# Patient Record
Sex: FEMALE | Race: WHITE | URBAN - METROPOLITAN AREA
[De-identification: names, ages, dates, MRNs, and addresses within clinical notes are randomized per-mention and may not be internally consistent; named-entity substitution may affect disease eponyms.]

---

## 2017-07-26 ENCOUNTER — RADIANT APPOINTMENT (OUTPATIENT)
Dept: MAMMOGRAPHY | Facility: CLINIC | Age: 57
End: 2017-07-26
Attending: INTERNAL MEDICINE
Payer: COMMERCIAL

## 2017-07-26 DIAGNOSIS — Z12.31 VISIT FOR SCREENING MAMMOGRAM: ICD-10-CM

## 2017-07-26 DIAGNOSIS — Z53.9 ERRONEOUS ENCOUNTER--DISREGARD: Primary | ICD-10-CM

## 2017-07-26 PROCEDURE — G0202 SCR MAMMO BI INCL CAD: HCPCS | Mod: TC

## 2017-07-26 PROCEDURE — 77063 BREAST TOMOSYNTHESIS BI: CPT | Mod: TC

## 2017-07-27 ENCOUNTER — OFFICE VISIT (OUTPATIENT)
Dept: INTERNAL MEDICINE | Facility: CLINIC | Age: 57
End: 2017-07-27
Payer: COMMERCIAL

## 2017-07-27 VITALS
HEIGHT: 67 IN | WEIGHT: 172.3 LBS | SYSTOLIC BLOOD PRESSURE: 120 MMHG | OXYGEN SATURATION: 98 % | HEART RATE: 72 BPM | BODY MASS INDEX: 27.04 KG/M2 | DIASTOLIC BLOOD PRESSURE: 80 MMHG | TEMPERATURE: 98.3 F

## 2017-07-27 DIAGNOSIS — D56.3 BETA THALASSEMIA TRAIT: ICD-10-CM

## 2017-07-27 DIAGNOSIS — Z12.4 SCREENING FOR CERVICAL CANCER: ICD-10-CM

## 2017-07-27 DIAGNOSIS — Z00.00 ENCOUNTER FOR ROUTINE ADULT HEALTH EXAMINATION WITHOUT ABNORMAL FINDINGS: Primary | ICD-10-CM

## 2017-07-27 PROCEDURE — G0145 SCR C/V CYTO,THINLAYER,RESCR: HCPCS | Performed by: INTERNAL MEDICINE

## 2017-07-27 PROCEDURE — 99396 PREV VISIT EST AGE 40-64: CPT | Performed by: INTERNAL MEDICINE

## 2017-07-27 PROCEDURE — 87624 HPV HI-RISK TYP POOLED RSLT: CPT | Performed by: INTERNAL MEDICINE

## 2017-07-27 NOTE — NURSING NOTE
"Chief Complaint   Patient presents with     Physical     Pt is fasting       Initial /80  Pulse 72  Temp 98.3  F (36.8  C) (Oral)  Ht 5' 7\" (1.702 m)  Wt 172 lb 4.8 oz (78.2 kg)  LMP 07/19/2015  SpO2 98%  BMI 26.99 kg/m2 Estimated body mass index is 26.99 kg/(m^2) as calculated from the following:    Height as of this encounter: 5' 7\" (1.702 m).    Weight as of this encounter: 172 lb 4.8 oz (78.2 kg).  Medication Reconciliation: complete   Shelia Braswell, ALFRED      "

## 2017-07-27 NOTE — PROGRESS NOTES
SUBJECTIVE:   CC: Genia Esquivel is an 56 year old woman who presents for preventive health visit.     Healthy Habits:    Do you get at least three servings of calcium containing foods daily (dairy, green leafy vegetables, etc.)? yes    Amount of exercise or daily activities, outside of work: 3 hrs a wk    Problems taking medications regularly Yes forgetting    Medication side effects: no    Have you had an eye exam in the past two years? yes    Do you see a dentist twice per year? yes    Do you have sleep apnea, excessive snoring or daytime drowsiness?no    Labwork through [employer] for work              Today's PHQ-2 Score: PHQ-2 ( 1999 [employer]) 7/27/2017 7/19/2016   Q1: Little interest or pleasure in doing things 0 0   Q2: Feeling down, depressed or hopeless 0 0   PHQ-2 Score 0 0   Q1: Little interest or pleasure in doing things - Not at all   Q2: Feeling down, depressed or hopeless - Not at all   PHQ-2 Score - 0       Abuse: Current or Past(Physical, Sexual or Emotional)- No  Do you feel safe in your environment - Yes    Social History   Substance Use Topics     Smoking status: Never Smoker     Smokeless tobacco: Never Used     Alcohol use Yes      Comment: x4 per week     The patient does not drink >3 drinks per day nor >7 drinks per week.    Reviewed orders with patient.  Reviewed health maintenance and updated orders accordingly - Yes      Patient over age 50, mutual decision to screen reflected in health maintenance.      Pertinent mammograms are reviewed under the imaging tab.  History of abnormal Pap smear: NO - age 30- 65 PAP every 3 years recommended    Reviewed and updated as needed this visit by clinical staffTobacco  Allergies         Reviewed and updated as needed this visit by Provider          Past Medical History:  ---------------------------  Past Medical History:   Diagnosis Date     Anemia     beta thalsiumia     Trigeminal neuralgia        Past Surgical  History:  ---------------------------  Past Surgical History:   Procedure Laterality Date     APPENDECTOMY       COLONOSCOPY       OSTEOTOMY FOOT Bilateral 4/7/2015    Procedure: OSTEOTOMY FOOT;  Surgeon: Elie Markham DPM;  [facility]     wisdom teeth         Current Medications:  ---------------------------  Current Outpatient Prescriptions   Medication Sig Dispense Refill     Cyanocobalamin (B-12 SUPER STRENGTH) 5000 MCG/ML LIQD Place 1 mL under the tongue daily FOR VITAMIN B12 SUPPLEMENTATION, PLEASE PLACE UNDER THE TONGUE 2 Bottle PRN     Calcium Carb-Cholecalciferol (CALCIUM 1000 + D) 1000-800 MG-UNIT TABS Take 1 tablet by mouth daily TAKE WITH FOOD, FOR BONE HEALTH AND FOR VITAMIN D SUPPLEMENTATION 100 tablet PRN     B Complex Vitamins (B COMPLEX 50) TABS Take 1 tablet by mouth twice a week INDICATION: VITAMIN SUPPLEMENT 100 tablet PRN     vitamin D3 (CHOLECALCIFEROL) 58484 UNITS capsule Take 1 capsule (50,000 Units) by mouth every 30 days INDICATION: VITAMIN D DEFICIENCY (LOW VITAMIN D) 40 capsule PRN       Allergies:  -------------  Allergies   Allergen Reactions     Niaspan [Niacin]      PARALYSIS AND SEVERE PAIN       Social History:  -------------------  Social History     Social History     Marital status:      Spouse name: N/A     Number of children: N/A     Years of education: N/A     Occupational History     tech sales [employer]     Social History Main Topics     Smoking status: Never Smoker     Smokeless tobacco: Never Used     Alcohol use Yes      Comment: x4 per week     Drug use: No     Sexual activity: Not Currently     Partners: Male      Comment: SPOUSE HAS HAD VASECTOMY     Other Topics Concern      Service No     Blood Transfusions No     Caffeine Concern No     Occupational Exposure No     Hobby Hazards No     Sleep Concern No     Stress Concern No     Weight Concern Yes     Special Diet No     Back Care No     Exercise No     Bike Helmet No     Seat Belt Yes      "Self-Exams Yes     Parent/Sibling W/ Cabg, Mi Or Angioplasty Before 65f 55m? No     Social History Narrative       Family Medical History:  ------------------------------  Family History   Problem Relation Age of Onset     DIABETES Mother      CANCER Mother      OVARIAN/at 72 yrs     Blood Disease Mother      Thalassemia     Alzheimer Disease Father      DIABETES Brother      at 41 yrs     DIABETES Sister      at 40 yrs     Lipids Brother      Obesity Brother      Connective Tissue Disorder Brother      Gout     DIABETES Brother      at 47yrs     Arthritis Maternal Grandmother      Rheumatoid     Blood Disease Son      Thalassemia     Blood Disease Brother      Thalassemia     Cancer - colorectal Maternal Aunt      At age 60 yrs     DIABETES Sister         ROS:  C: NEGATIVE for fever, chills, change in weight  I: NEGATIVE for worrisome rashes, moles or lesions  E: NEGATIVE for vision changes or irritation  ENT: NEGATIVE for ear, mouth and throat problems  R: NEGATIVE for significant cough or SOB  B: NEGATIVE for masses, tenderness or discharge  CV: NEGATIVE for chest pain, palpitations or peripheral edema  GI: NEGATIVE for nausea, abdominal pain, heartburn, or change in bowel habits  : NEGATIVE for unusual urinary or vaginal symptoms. No vaginal bleeding.  M: NEGATIVE for significant arthralgias or myalgia  N: NEGATIVE for weakness, dizziness or paresthesias  P: NEGATIVE for changes in mood or affect     OBJECTIVE:   /80  Pulse 72  Temp 98.3  F (36.8  C) (Oral)  Ht 5' 7\" (1.702 m)  Wt 172 lb 4.8 oz (78.2 kg)  LMP 07/19/2015  SpO2 98%  BMI 26.99 kg/m2  EXAM:    GENERAL healthy, alert and no distress.  EYES conjunctivae/corneas clear. PERRL, EOM's intact  HENT: NCAT,oral and posterior pharynx without lesions or erythema, facies symmetric  NECK: Neck supple. No LAD, without thyroidmegaly or JVD.  RESP: Clear to ausculation bilaterally without wheezes or crackles. Normal BS in all fields.  CV: RRR normal " S1S2 without  murmurs, rubs or gallops. PMI normal  LYMPH: no cervical lymph adenopathy appreciated  GI: NTND, no organomegaly, normal BS in all quadrants, without rebound or guarding  MS: No cyanosis, clubbing or edema noted bilaterally in Upper and Lower Extremities  SKIN: no ulcers, lesions or rash  NEURO: Alert and Oriented x 3, Gait normal. Reflexes normal and symmetric. Sensation grossly WNL..   BREAST:  Breasts are symmetric.  No dominant, discrete, fixed  or suspicious masses are noted.  Mild symmetric fibrocystic densities are noted in both upper outer quadrants. No skin or nipple changes or axillary nodes. Self exam is taught and encouraged. Mammogram status was reviewed with the pt and recommended if she is due.  Pelvic:  Vagina and vulva are normal;  no discharge is noted.  Cervix normal without lesions. Uterus anteverted and mobile, normal in size and shape without tenderness.  Adnexa normal in size without masses or tenderness. Pap Smear - is completed today.     Entire physical exam chaperoned by Nurse.     ASSESSMENT/PLAN:     (Z00.00) Encounter for routine adult health examination without abnormal findings  (primary encounter diagnosis)  Comment: Discussed self breast exam, schedule for mammogram.  Discussed importance of regular pelvic exams and PAP smears to check for GYN malignancies.  Discussed cardiac risk factor modification including screening for (and treating if present) cholesterol, HTN, DM, and avoiding smoking.  Recommended a low fat diet and regular aerobic activity.    Counseling:      ATP III Guidelines  ICSI Preventive Guidelines   Plan:     (Z12.4) Screening for cervical cancer  Comment:   Plan: Pap imaged thin layer screen with HPV -         recommended age 30 - 65 years (select HPV order        below), HPV High Risk Types DNA Cervical            (D56.3) Beta thalassemia trait  Comment: chronically low Hgb and MCV  Plan:        COUNSELING:   Reviewed preventive health counseling,  "as reflected in patient instructions       Regular exercise       Healthy diet/nutrition       Vision screening       Hearing screening       Aspirin Prophylaxsis       Alcohol Use       Colon cancer screening         reports that she has never smoked. She has never used smokeless tobacco.    Estimated body mass index is 26.99 kg/(m^2) as calculated from the following:    Height as of this encounter: 5' 7\" (1.702 m).    Weight as of this encounter: 172 lb 4.8 oz (78.2 kg).         Counseling Resources:  ATP IV Guidelines  Pooled Cohorts Equation Calculator  Breast Cancer Risk Calculator  FRAX Risk Assessment  ICSI Preventive Guidelines  Dietary Guidelines for Americans, 2010  USDA's MyPlate  ASA Prophylaxis  Lung CA Screening    Tenzin Waters MD  [facility]  "

## 2017-07-27 NOTE — MR AVS SNAPSHOT
"              After Visit Summary   7/27/2017    Genia Esquivel    MRN: [id number]           Patient Information     Date Of Birth          1960        Visit Information        Provider Department      7/27/2017 8:40 AM Tenzin Waters MD [facility]        Today's Diagnoses     Screening for cervical cancer    -  1      Care Instructions    *  Screening colonoscopy next due 2018    5 GOALS TO PREVENT VASCULAR DISEASE:     1.  Aggressive blood pressure control, under 130/80 ideally.  Using medications if needed.    Your blood pressure is under good control    BP Readings from Last 4 Encounters:   07/27/17 120/80   07/19/16 110/72   09/03/15 120/78   07/13/15 108/74       2.  Aggressive LDL cholesterol (\"bad cholesterol\") lowering as indicated.    Your goal is an LDL under 130 for sure, preferably under 100.  (If you have diabetes or previous vascular disease, the the LDL goals would be under 100 for sure, preferably under 70.)    New guidelines identify four high-risk groups who could benefit from statins:   *people with pre-existing heart disease, such as those who have had a heart attack;   *people ages 40 to 75 who have diabetes of any type  *patients ages 40 to 75 with at least a 7.5% risk of developing cardiovascular disease over the next decade, according to a formula described in the guidelines  *patients with the sort of super-high cholesterol that sometimes runs in families, as evidenced by an LDL of 190 milligrams per deciliter or higher    Your cholesterol levels are controlled.    Recent Labs   Lab Test  07/19/16   1029  08/29/14   0855  04/25/13   0808   CHOL  213*  167  172   HDL  73  64  57   LDL  101*  78  73   TRIG  193*  124  212*   CHOLHDLRATIO   --   2.6  3.0       3.  Aggressive diabetic prevention, screening and/or management.      You do not have diabetes as of the most recent blood tests.     4.  No smoking    5.  Consider taking low dose aspirin (81 " "mg) tablet once per day over the age of 50, every day unless there is a specific reason that you cannot take aspirin (such as side effect, allergy, or you are on another \"blood thinner\").        --Based on your relative lack of current cardiac risk factors, you do NOT need to take Aspirin on regular basis.       Preventive Health Recommendations  Female Ages 50 - 64    Yearly exam: See your health care provider every year in order to  o Review health changes.   o Discuss preventive care.    o Review your medicines if your doctor has prescribed any.      Get a Pap test every three years (unless you have an abnormal result and your provider advises testing more often).    If you get Pap tests with HPV test, you only need to test every 5 years, unless you have an abnormal result.     You do not need a Pap test if your uterus was removed (hysterectomy) and you have not had cancer.    You should be tested each year for STDs (sexually transmitted diseases) if you're at risk.     Have a mammogram every 1 to 2 years.    Have a colonoscopy at age 50, or have a yearly FIT test (stool test). These exams screen for colon cancer.      Have a cholesterol test every 5 years, or more often if advised.    Have a diabetes test (fasting glucose) every three years. If you are at risk for diabetes, you should have this test more often.     If you are at risk for osteoporosis (brittle bone disease), think about having a bone density scan (DEXA).    Shots: Get a flu shot each year. Get a tetanus shot every 10 years.    Nutrition:     Eat at least 5 servings of fruits and vegetables each day.    Eat whole-grain bread, whole-wheat pasta and brown rice instead of white grains and rice.    Talk to your provider about Calcium and Vitamin D.        --Good Grains:  Oats, brown rice, Quinoa (these do not raise the blood sugar as much)     --Bad grains:  Anything made from wheat or white rice     (because these raise the blood sugars " "significantly, and the possible gluten issue from wheat for some people).      --Proteins:  Aim for \"lean proteins\" including chicken, fish, seafood, pork, turkey, and eggs (in moderation); Eat red meat only occasionally          Elie Edwigeanatoly:                    Lifestyle    Exercise at least 150 minutes a week (30 minutes a day, 5 days a week). This will help you control your weight and prevent disease.    Limit alcohol to one drink per day.    No smoking.     Wear sunscreen to prevent skin cancer.     See your dentist every six months for an exam and cleaning.    See your eye doctor every 1 to 2 years.            Follow-ups after your visit        Who to contact     If you have questions or need follow up information about today's clinic visit or your schedule please contact [facility] directly at [phone].  Normal or non-critical lab and imaging results will be communicated to you by [employer], letter or phone within 4 business days after the clinic has received the results. If you do not hear from us within 7 days, please contact the clinic through [employer] or phone. If you have a critical or abnormal lab result, we will notify you by phone as soon as possible.  Submit refill requests through [employer] or call your pharmacy and they will forward the refill request to us. Please allow 3 business days for your refill to be completed.          Additional Information About Your Visit        [employer] Information     [employer] gives you secure access to your electronic health record. If you see a primary care provider, you can also send messages to your care team and make appointments. If you have questions, please call your primary care clinic.  If you do not have a primary care provider, please call [phone] and they will assist you.        Care EveryWhere ID     This is your Care EveryWhere ID. This could be used by other organizations to access your [facility] " "records  [id number]        Your Vitals Were     Pulse Temperature Height Last Period Pulse Oximetry BMI (Body Mass Index)    72 98.3  F (36.8  C) (Oral) 5' 7\" (1.702 m) 07/19/2015 98% 26.99 kg/m2       Blood Pressure from Last 3 Encounters:   07/27/17 120/80   07/19/16 110/72   09/03/15 120/78    Weight from Last 3 Encounters:   07/27/17 172 lb 4.8 oz (78.2 kg)   07/19/16 175 lb 12.8 oz (79.7 kg)   09/03/15 169 lb (76.7 kg)              We Performed the Following     HPV High Risk Types DNA Cervical     Pap imaged thin layer screen with HPV - recommended age 30 - 65 years (select HPV order below)        Primary Care Provider Office Phone # Fax #    John Giles [phone]       [facility] [address] [facility] [address]        Equal Access to Services     KANE KING AH: Hadii barbara das hadasho Soomaali, waaxda luqadaha, qaybta kaalmada adeegyada, bennie gaona . So [facility] [phone].    ATENCIÓN: Si habla español, tiene a montes disposición servicios gratuitos de asistencia lingüística. Llame al [phone].    We comply with applicable federal civil rights laws and [place] laws. We do not discriminate on the basis of race, color, national origin, age, disability sex, sexual orientation or gender identity.            Thank you!     Thank you for choosing [facility]  for your care. Our goal is always to provide you with excellent care. Hearing back from our patients is one way we can continue to improve our services. Please take a few minutes to complete the written survey that you may receive in the mail after your visit with us. Thank you!             Your Updated Medication List - Protect others around you: Learn how to safely use, store and throw away your medicines at www.disposemymeds.org.          This list is accurate as of: 7/27/17 10:27 AM.  Always use your most recent med list.                   Brand Name Dispense " Instructions for use Diagnosis    B COMPLEX 50 Tabs     100 tablet    Take 1 tablet by mouth twice a week INDICATION: VITAMIN SUPPLEMENT    Thiamin deficiency       Calcium Carb-Cholecalciferol 1000-800 MG-UNIT Tabs    CALCIUM 1000 + D    100 tablet    Take 1 tablet by mouth daily TAKE WITH FOOD, FOR BONE HEALTH AND FOR VITAMIN D SUPPLEMENTATION    Metabolic syndrome       Cyanocobalamin 5000 MCG/ML Liqd    B-12 SUPER STRENGTH    2 Bottle    Place 1 mL under the tongue daily FOR VITAMIN B12 SUPPLEMENTATION, PLEASE PLACE UNDER THE TONGUE    Vitamin B12 deficiency (non anemic)       vitamin D3 08184 UNITS capsule    CHOLECALCIFEROL    40 capsule    Take 1 capsule (50,000 Units) by mouth every 30 days INDICATION: VITAMIN D DEFICIENCY (LOW VITAMIN D)    Vitamin D deficiency disease

## 2017-07-27 NOTE — PATIENT INSTRUCTIONS
"*  Screening colonoscopy next due 2018    5 GOALS TO PREVENT VASCULAR DISEASE:     1.  Aggressive blood pressure control, under 130/80 ideally.  Using medications if needed.    Your blood pressure is under good control    BP Readings from Last 4 Encounters:   07/27/17 120/80   07/19/16 110/72   09/03/15 120/78   07/13/15 108/74       2.  Aggressive LDL cholesterol (\"bad cholesterol\") lowering as indicated.    Your goal is an LDL under 130 for sure, preferably under 100.  (If you have diabetes or previous vascular disease, the the LDL goals would be under 100 for sure, preferably under 70.)    New guidelines identify four high-risk groups who could benefit from statins:   *people with pre-existing heart disease, such as those who have had a heart attack;   *people ages 40 to 75 who have diabetes of any type  *patients ages 40 to 75 with at least a 7.5% risk of developing cardiovascular disease over the next decade, according to a formula described in the guidelines  *patients with the sort of super-high cholesterol that sometimes runs in families, as evidenced by an LDL of 190 milligrams per deciliter or higher    Your cholesterol levels are controlled.    Recent Labs   Lab Test  07/19/16   1029  08/29/14   0855  04/25/13   0808   CHOL  213*  167  172   HDL  73  64  57   LDL  101*  78  73   TRIG  193*  124  212*   CHOLHDLRATIO   --   2.6  3.0       3.  Aggressive diabetic prevention, screening and/or management.      You do not have diabetes as of the most recent blood tests.     4.  No smoking    5.  Consider taking low dose aspirin (81 mg) tablet once per day over the age of 50, every day unless there is a specific reason that you cannot take aspirin (such as side effect, allergy, or you are on another \"blood thinner\").        --Based on your relative lack of current cardiac risk factors, you do NOT need to take Aspirin on regular basis.       Preventive Health Recommendations  Female Ages 50 - 64    Yearly exam: " "See your health care provider every year in order to  o Review health changes.   o Discuss preventive care.    o Review your medicines if your doctor has prescribed any.      Get a Pap test every three years (unless you have an abnormal result and your provider advises testing more often).    If you get Pap tests with HPV test, you only need to test every 5 years, unless you have an abnormal result.     You do not need a Pap test if your uterus was removed (hysterectomy) and you have not had cancer.    You should be tested each year for STDs (sexually transmitted diseases) if you're at risk.     Have a mammogram every 1 to 2 years.    Have a colonoscopy at age 50, or have a yearly FIT test (stool test). These exams screen for colon cancer.      Have a cholesterol test every 5 years, or more often if advised.    Have a diabetes test (fasting glucose) every three years. If you are at risk for diabetes, you should have this test more often.     If you are at risk for osteoporosis (brittle bone disease), think about having a bone density scan (DEXA).    Shots: Get a flu shot each year. Get a tetanus shot every 10 years.    Nutrition:     Eat at least 5 servings of fruits and vegetables each day.    Eat whole-grain bread, whole-wheat pasta and brown rice instead of white grains and rice.    Talk to your provider about Calcium and Vitamin D.        --Good Grains:  Oats, brown rice, Quinoa (these do not raise the blood sugar as much)     --Bad grains:  Anything made from wheat or white rice     (because these raise the blood sugars significantly, and the possible gluten issue from wheat for some people).      --Proteins:  Aim for \"lean proteins\" including chicken, fish, seafood, pork, turkey, and eggs (in moderation); Eat red meat only occasionally          Elie Matamoros:                    Lifestyle    Exercise at least 150 minutes a week (30 minutes a day, 5 days a week). This will help you control your weight and " prevent disease.    Limit alcohol to one drink per day.    No smoking.     Wear sunscreen to prevent skin cancer.     See your dentist every six months for an exam and cleaning.    See your eye doctor every 1 to 2 years.

## 2017-08-01 LAB
COPATH REPORT: NORMAL
PAP: NORMAL

## 2017-08-03 LAB
FINAL DIAGNOSIS: NORMAL
HPV HR 12 DNA CVX QL NAA+PROBE: NEGATIVE
HPV16 DNA SPEC QL NAA+PROBE: NEGATIVE
HPV18 DNA SPEC QL NAA+PROBE: NEGATIVE
SPECIMEN DESCRIPTION: NORMAL

## 2017-09-08 ENCOUNTER — TRANSFERRED RECORDS (OUTPATIENT)
Dept: HEALTH INFORMATION MANAGEMENT | Facility: CLINIC | Age: 57
End: 2017-09-08

## 2017-09-08 LAB
ALT SERPL-CCNC: 61 U/L (ref 9–52)
AST SERPL-CCNC: 130 U/L (ref 14–36)
CREAT SERPL-MCNC: 0.7 MG/DL (ref 0.5–1)
GFR SERPL CREATININE-BSD FRML MDRD: >60 ML/MIN/1.73M2
GLUCOSE SERPL-MCNC: 117 MG/DL (ref 70–125)
POTASSIUM SERPL-SCNC: 3.6 MMOL/L (ref 3.5–5.1)

## 2017-09-11 ENCOUNTER — MYC MEDICAL ADVICE (OUTPATIENT)
Dept: INTERNAL MEDICINE | Facility: CLINIC | Age: 57
End: 2017-09-11

## 2017-09-11 DIAGNOSIS — K80.20 GALLSTONES: Primary | ICD-10-CM

## 2017-09-11 DIAGNOSIS — K80.80 BILIARY CALCULUS OF OTHER SITE WITHOUT OBSTRUCTION: ICD-10-CM

## 2017-09-13 NOTE — TELEPHONE ENCOUNTER
No need to come for an appointment just to get referral.   See the surgeons.   Referral ordered.   Return to see us if a pre-op if needed.

## 2017-09-13 NOTE — TELEPHONE ENCOUNTER
Patient needing a referral for general surgery d/t gallstones.  Order pended, please sign if appropriate.

## 2017-09-13 NOTE — TELEPHONE ENCOUNTER
Agree with plan. Also called and left a message on Genia's voicemail for her to call if she has any other questions or concerns

## 2017-09-13 NOTE — TELEPHONE ENCOUNTER
Pt is requesting a referral to general surgeon . Information from [place] on MD desk .Neda Rivera RN

## 2017-09-21 ENCOUNTER — OFFICE VISIT (OUTPATIENT)
Dept: SURGERY | Facility: CLINIC | Age: 57
End: 2017-09-21
Payer: COMMERCIAL

## 2017-09-21 VITALS
HEIGHT: 67 IN | SYSTOLIC BLOOD PRESSURE: 90 MMHG | BODY MASS INDEX: 27 KG/M2 | HEART RATE: 70 BPM | WEIGHT: 172 LBS | DIASTOLIC BLOOD PRESSURE: 58 MMHG

## 2017-09-21 DIAGNOSIS — K80.20 SYMPTOMATIC CHOLELITHIASIS: Primary | ICD-10-CM

## 2017-09-21 PROCEDURE — 99204 OFFICE O/P NEW MOD 45 MIN: CPT | Performed by: SURGERY

## 2017-09-21 NOTE — LETTER
[facility]    September 21, 2017        Elei Atwood MD     Genia Infante MRN# [id number]   YOB: 1960 Age: 57 year old      PCP:  John Giles [phone]     Chief Complaint:  Right upper quadrant pain, nausea     History of Present Illness:  Genia Infante is a 57 year old female who presented with several episodes of upper abdominal pain and intermittent nausea, usually after eating.  Commonly associated with eating greasy foods.  She had her most severe episode recently when she was traveling to [place].  She was seen in an emergency department where she was found to have mildly elevated transaminases.  Right upper quadrant ultrasound was performed and this revealed gallstones.  No obvious evidence for cholecystitis and she was discharged.  The patient is now here to discuss diagnosis and management options.     PMH:  Genia Infante  has a past medical history of Anemia and Trigeminal neuralgia.  PSH:  Genia Infante  has a past surgical history that includes wisdom teeth; appendectomy; colonoscopy; and Osteotomy foot (Bilateral, 4/7/2015).     Home medications and allergies reviewed.     Social History:  Genia Infante  reports that she has never smoked. She has never used smokeless tobacco. She reports that she drinks alcohol. She reports that she does not use illicit drugs.  Family History:  Genia Infante family history includes Alzheimer Disease in her father; Arthritis in her maternal grandmother; Blood Disease in her brother, mother, and son; CANCER in her mother; Cancer - colorectal in her maternal aunt; Connective Tissue Disorder in her brother; DIABETES in her brother, brother, mother, sister, and sister; Lipids in her brother; Obesity in her brother.     ROS:  The 10 point Review of Systems is negative other than noted in the HPI.  Currently no nausea or vomiting.  No significant pain or dysuria..     Physical  "Exam:  Blood pressure 90/58, pulse 70, height 5' 7\" (1.702 m), weight 172 lb (78 kg), last menstrual period 07/19/2015, not currently breastfeeding.  172 lbs 0 oz  Healthy-appearing female in no distress.  Patient has a pleasant affect, speaks without difficulty.   Pupils equal round and reactive to light.   No cervical lymphadenopathy or thyromegaly.   Lung fields clear, breathing comfortably.   Heart normal sinus rhythm.  No murmurs rubs or gallops.  Abdomen soft, nontender, nondistended.  Minimal tenderness in the right upper quadrant, no masses appreciated. No peritoneal signs or rebound.  Skin warm, dry, without rashes or lesions.     All new lab and imaging data was reviewed.  Abdominal ultrasound shows gallbladder with stones, no wall thickening.        Assessment/plan:  Genia Infante is a 57 year old female with signs and symptoms suggesting symptomatic cholelithiasis. I've recommended laparoscopic cholecystectomy. Surgical comorbidities include previous abdominal surgery, appendectomy as a child.  I feel the patient is a good candidate for the surgery and that this should be able to be done as an outpatient. They were going to consider their options and likely schedule surgery.     Fidel Atwood M.D.  [facility]  [phone]    "

## 2017-09-21 NOTE — MR AVS SNAPSHOT
"              After Visit Summary   9/21/2017    Genia Infante    MRN: [id number]           Patient Information     Date Of Birth          1960        Visit Information        Provider Department      9/21/2017 2:15 PM Elie Atwood MD [facility] General Surgery       Follow-ups after your visit        Who to contact     If you have questions or need follow up information about today's clinic visit or your schedule please contact [facility] directly at [phone].  Normal or non-critical lab and imaging results will be communicated to you by [employer], letter or phone within 4 business days after the clinic has received the results. If you do not hear from us within 7 days, please contact the clinic through [employer] or phone. If you have a critical or abnormal lab result, we will notify you by phone as soon as possible.  Submit refill requests through [employer] or call your pharmacy and they will forward the refill request to us. Please allow 3 business days for your refill to be completed.          Additional Information About Your Visit        MyChart Information     [employer] gives you secure access to your electronic health record. If you see a primary care provider, you can also send messages to your care team and make appointments. If you have questions, please call your primary care clinic.  If you do not have a primary care provider, please call [phone] and they will assist you.        Care EveryWhere ID     This is your Care EveryWhere ID. This could be used by other organizations to access your [place] medical records  [id number]        Your Vitals Were     Pulse Height Last Period BMI (Body Mass Index)          70 5' 7\" (1.702 m) 07/19/2015 26.94 kg/m2         Blood Pressure from Last 3 Encounters:   09/21/17 90/58   07/27/17 120/80   07/19/16 110/72    Weight from Last 3 Encounters:   09/21/17 172 lb (78 kg)   07/27/17 " 172 lb 4.8 oz (78.2 kg)   07/19/16 175 lb 12.8 oz (79.7 kg)              Today, you had the following     No orders found for display       Primary Care Provider Office Phone # Fax #    John Giles [phone]       [address] [facility] [address]        Equal Access to Services     [facility]: Hadii aad ku hadasho Soomaali, waaxda luqadaha, qaybta kaalmada adeegyada, waxay idiin hayaan adeeg bam laPeterbindun ah. So [facility] [phone].    ATENCIÓN: Si toyala fran, tiene a montes disposición servicios gratuitos de asistencia lingüística. [facility] [phone].    We comply with applicable federal civil rights laws and [place] laws. We do not discriminate on the basis of race, color, national origin, age, disability sex, sexual orientation or gender identity.            Thank you!     Thank you for choosing [facility]  for your care. Our goal is always to provide you with excellent care. Hearing back from our patients is one way we can continue to improve our services. Please take a few minutes to complete the written survey that you may receive in the mail after your visit with us. Thank you!             Your Updated Medication List - Protect others around you: Learn how to safely use, store and throw away your medicines at www.disposemymeds.org.          This list is accurate as of: 9/21/17  2:44 PM.  Always use your most recent med list.                   Brand Name Dispense Instructions for use Diagnosis    B COMPLEX 50 Tabs     100 tablet    Take 1 tablet by mouth twice a week INDICATION: VITAMIN SUPPLEMENT    Thiamin deficiency       Calcium Carb-Cholecalciferol 1000-800 MG-UNIT Tabs    CALCIUM 1000 + D    100 tablet    Take 1 tablet by mouth daily TAKE WITH FOOD, FOR BONE HEALTH AND FOR VITAMIN D SUPPLEMENTATION    Metabolic syndrome       Cyanocobalamin 5000 MCG/ML Liqd    B-12 SUPER STRENGTH    2 Bottle    Place 1 mL under the tongue daily FOR VITAMIN B12  SUPPLEMENTATION, PLEASE PLACE UNDER THE TONGUE    Vitamin B12 deficiency (non anemic)       vitamin D3 00941 UNITS capsule    CHOLECALCIFEROL    40 capsule    Take 1 capsule (50,000 Units) by mouth every 30 days INDICATION: VITAMIN D DEFICIENCY (LOW VITAMIN D)    Vitamin D deficiency disease

## 2017-09-22 PROBLEM — K80.20 SYMPTOMATIC CHOLELITHIASIS: Status: ACTIVE | Noted: 2017-09-22

## 2017-09-22 NOTE — PROGRESS NOTES
Surgery Consultation, [facility] DOMINIQUE Atwood MD    Genia Infante MRN# [id number]   YOB: 1960 Age: 57 year old     PCP:  John Giles [phone]    Chief Complaint:  Right upper quadrant pain, nausea    History of Present Illness:  Genia Infante is a 57 year old female who presented with several episodes of upper abdominal pain and intermittent nausea, usually after eating.  Commonly associated with eating greasy foods.  She had her most severe episode recently when she was traveling to [place].  She was seen in an emergency department where she was found to have mildly elevated transaminases.  Right upper quadrant ultrasound was performed and this revealed gallstones.  No obvious evidence for cholecystitis and she was discharged.  The patient is now here to discuss diagnosis and management options.    PMH:  Genia Infante  has a past medical history of Anemia and Trigeminal neuralgia.  PSH:  Genia Infante  has a past surgical history that includes wisdom teeth; appendectomy; colonoscopy; and Osteotomy foot (Bilateral, 4/7/2015).    Home medications and allergies reviewed.    Social History:  Genia Infante  reports that she has never smoked. She has never used smokeless tobacco. She reports that she drinks alcohol. She reports that she does not use illicit drugs.  Family History:  Genia Infante family history includes Alzheimer Disease in her father; Arthritis in her maternal grandmother; Blood Disease in her brother, mother, and son; CANCER in her mother; Cancer - colorectal in her maternal aunt; Connective Tissue Disorder in her brother; DIABETES in her brother, brother, mother, sister, and sister; Lipids in her brother; Obesity in her brother.    ROS:  The 10 point Review of Systems is negative other than noted in the HPI.  Currently no nausea or vomiting.  No significant pain or dysuria..    Physical Exam:  Blood pressure 90/58, pulse  "70, height 5' 7\" (1.702 m), weight 172 lb (78 kg), last menstrual period 07/19/2015, not currently breastfeeding.  172 lbs 0 oz  Healthy-appearing female in no distress.  Patient has a pleasant affect, speaks without difficulty.   Pupils equal round and reactive to light.   No cervical lymphadenopathy or thyromegaly.   Lung fields clear, breathing comfortably.   Heart normal sinus rhythm.  No murmurs rubs or gallops.  Abdomen soft, nontender, nondistended.  Minimal tenderness in the right upper quadrant, no masses appreciated. No peritoneal signs or rebound.  Skin warm, dry, without rashes or lesions.    All new lab and imaging data was reviewed.  Abdominal ultrasound shows gallbladder with stones, no wall thickening.       Assessment/plan:  Genia Infante is a 57 year old female with signs and symptoms suggesting symptomatic cholelithiasis. I've recommended laparoscopic cholecystectomy. Surgical comorbidities include previous abdominal surgery, appendectomy as a child.  I feel the patient is a good candidate for the surgery and that this should be able to be done as an outpatient. They were going to consider their options and likely schedule surgery.    Fidel Atwood M.D.  [facility]  [phone]    Please route or send letter to:  Primary Care Provider (PCP) and Referring Provider  "

## 2017-10-02 ENCOUNTER — OFFICE VISIT (OUTPATIENT)
Dept: INTERNAL MEDICINE | Facility: CLINIC | Age: 57
End: 2017-10-02
Payer: COMMERCIAL

## 2017-10-02 VITALS
HEIGHT: 67 IN | OXYGEN SATURATION: 100 % | DIASTOLIC BLOOD PRESSURE: 82 MMHG | WEIGHT: 178.2 LBS | SYSTOLIC BLOOD PRESSURE: 118 MMHG | BODY MASS INDEX: 27.97 KG/M2 | HEART RATE: 69 BPM | TEMPERATURE: 97.7 F

## 2017-10-02 DIAGNOSIS — D50.9 IRON DEFICIENCY ANEMIA, UNSPECIFIED IRON DEFICIENCY ANEMIA TYPE: ICD-10-CM

## 2017-10-02 DIAGNOSIS — E88.810 METABOLIC SYNDROME: ICD-10-CM

## 2017-10-02 DIAGNOSIS — K80.20 CALCULUS OF GALLBLADDER WITHOUT CHOLECYSTITIS WITHOUT OBSTRUCTION: ICD-10-CM

## 2017-10-02 DIAGNOSIS — K80.20 SYMPTOMATIC CHOLELITHIASIS: ICD-10-CM

## 2017-10-02 DIAGNOSIS — Z01.818 PREOP GENERAL PHYSICAL EXAM: Primary | ICD-10-CM

## 2017-10-02 PROCEDURE — 99215 OFFICE O/P EST HI 40 MIN: CPT | Performed by: PHYSICIAN ASSISTANT

## 2017-10-02 NOTE — PROGRESS NOTES
[address] [address]  [phone]  Dept: [phone]    PRE-OP EVALUATION:  Today's date: 10/2/2017    Genia Infante (: 1960) presents for pre-operative evaluation assessment as requested by Dr. Atwood .  She requires evaluation and anesthesia risk assessment prior to undergoing surgery/procedure for treatment of Gallbladder stones .  Proposed procedure:LAPAROSCOPIC CHOLECYSTECTOMY     Date of Surgery/ Procedure: 10/09  Time of Surgery/ Procedure: 7:30  Hospital/Surgical [facility]     Primary Physician: John Giles  Type of Anesthesia Anticipated: General    Patient has a Health Care Directive or Living Will:  NO    Preop Questions 10/2/2017   1.  Do you have a history of heart attack, stroke, stent, bypass or surgery on an artery in the head, neck, heart or legs? No   2.  Do you ever have any pain or discomfort in your chest? No   3.  Do you have a history of  Heart Failure? No   4.   Are you troubled by shortness of breath when:  walking on a level surface, or up a slight hill, or at night? No   5.  Do you currently have a cold, bronchitis or other respiratory infection? No   6.  Do you have a cough, shortness of breath, or wheezing? No   7.  Do you sometimes get pains in the calves of your legs when you walk? No   8. Do you or anyone in your family have previous history of blood clots? No   9.  Do you or does anyone in your family have a serious bleeding problem such as prolonged bleeding following surgeries or cuts? No   10. Have you ever had problems with anemia or been told to take iron pills? YES - self    11. Have you had any abnormal blood loss such as black, tarry or bloody stools, or abnormal vaginal bleeding? No   12. Have you ever had a blood transfusion? No   13. Have you or any of your relatives ever had problems with anesthesia? No   14. Do you have sleep apnea, excessive snoring or daytime drowsiness? No    15. Do you have any prosthetic heart valves? No   16. Do you have prosthetic joints? No   17. Is there any chance that you may be pregnant? No           HPI:                                                      Brief HPI related to upcoming procedure: cholelithiasis.  ER visit 9/8/2017 in [place] for RUQ abdominal pain.  She has been on a low fat diet since dx.       See problem list for active medical problems.  Problems all longstanding and stable, except as noted/documented.  See ROS for pertinent symptoms related to these conditions.                                                                                                  .    MEDICAL HISTORY:                                                    Patient Active Problem List    Diagnosis Date Noted     Symptomatic cholelithiasis 09/22/2017     Priority: Medium     Cystocele, midline 07/14/2014     Priority: Medium     Urinary incontinence 07/14/2014     Priority: Medium     Vitamin D deficiency 05/02/2013     Priority: Medium     WITH BORDERLINE B12 LEVELS       Thiamin deficiency 05/02/2013     Priority: Medium     Iron deficiency anemia 05/02/2013     Priority: Medium     Metabolic syndrome 05/02/2013     Priority: Medium     Vitamin B12 deficiency without anemia 11/09/2012     Priority: Medium     Diagnosis updated by automated process. Provider to review and confirm.       Vitamin D deficiency disease 11/09/2012     Priority: Medium     Seasonal allergic rhinitis 11/09/2012     Priority: Medium     CARDIOVASCULAR SCREENING; LDL GOAL LESS THAN 160 10/31/2010     Priority: Medium     Beta thalassemia trait 09/12/2008     Priority: Medium     Diagnosis updated by automated process. Provider to review and confirm.       Trigeminal neuralgia      Priority: Medium      Past Medical History:   Diagnosis Date     Anemia     beta thalsiumia     Trigeminal neuralgia      Past Surgical History:   Procedure Laterality Date     APPENDECTOMY       COLONOSCOPY        OSTEOTOMY FOOT Bilateral 4/7/2015    Procedure: OSTEOTOMY FOOT;  Surgeon: Elie Markham DPM;  Location:  [facility]     wisdom teeth       Current Outpatient Prescriptions   Medication Sig Dispense Refill     Cyanocobalamin (B-12 SUPER STRENGTH) 5000 MCG/ML LIQD Place 1 mL under the tongue daily FOR VITAMIN B12 SUPPLEMENTATION, PLEASE PLACE UNDER THE TONGUE 2 Bottle PRN     Calcium Carb-Cholecalciferol (CALCIUM 1000 + D) 1000-800 MG-UNIT TABS Take 1 tablet by mouth daily TAKE WITH FOOD, FOR BONE HEALTH AND FOR VITAMIN D SUPPLEMENTATION 100 tablet PRN     B Complex Vitamins (B COMPLEX 50) TABS Take 1 tablet by mouth twice a week INDICATION: VITAMIN SUPPLEMENT 100 tablet PRN     vitamin D3 (CHOLECALCIFEROL) 62075 UNITS capsule Take 1 capsule (50,000 Units) by mouth every 30 days INDICATION: VITAMIN D DEFICIENCY (LOW VITAMIN D) 40 capsule PRN     OTC products: None, except as noted above and no recent use of OTC ASA, NSAIDS or Steroids    Allergies   Allergen Reactions     Niaspan [Niacin]      PARALYSIS AND SEVERE PAIN      Latex Allergy: NO    Social History   Substance Use Topics     Smoking status: Never Smoker     Smokeless tobacco: Never Used     Alcohol use Yes      Comment: x4 per week     History   Drug Use No       REVIEW OF SYSTEMS:                                                    C: NEGATIVE for fever, chills, change in weight  INTEGUMENTARY/SKIN: NEGATIVE for worrisome rashes, moles or lesions  EYES: NEGATIVE for vision changes or irritation  E/M: NEGATIVE for ear, mouth and throat problems  R: NEGATIVE for significant cough or SOB  CV: NEGATIVE for chest pain, palpitations or peripheral edema  MUSCULOSKELETAL: NEGATIVE for significant arthralgias or myalgia  NEURO: NEGATIVE for weakness, dizziness or paresthesias  ENDOCRINE: NEGATIVE for temperature intolerance, skin/hair changes  HEME/ALLERGY/IMMUNE: NEGATIVE for bleeding problems  PSYCHIATRIC: NEGATIVE for changes in mood or affect  ROS otherwise  "negative    EXAM:                                                    /82 (BP Location: Left arm, Patient Position: Chair, Cuff Size: Adult Regular)  Pulse 69  Temp 97.7  F (36.5  C) (Oral)  Ht 5' 7\" (1.702 m)  Wt 178 lb 3.2 oz (80.8 kg)  LMP 07/19/2015  SpO2 100%  BMI 27.91 kg/m2  GENERAL APPEARANCE: healthy, alert and no distress  EYES: Eyes grossly normal to inspection, PERRL and conjunctivae and sclerae normal  HENT: ear canals and TM's normal and nose and mouth without ulcers or lesions  RESP: lungs clear to auscultation - no rales, rhonchi or wheezes  CV: regular rate and rhythm, normal S1 S2, no S3 or S4 and no murmur, click or rub   ABDOMEN: soft, nontender, no HSM or masses and bowel sounds normal  MS: extremities normal- no gross deformities noted  SKIN: no suspicious lesions or rashes  NEURO: Normal strength and tone, sensory exam grossly normal, mentation intact and speech normal  PSYCH: mentation appears normal and affect normal/bright    DIAGNOSTICS:                                                    EKG: appears normal, NSR, normal axis, normal intervals, no acute ST/T changes c/w ischemia, no LVH by voltage criteria, there are no prior tracings available  From [facility]  12-Lead ECG (09/08/2017 3:13 PM)  12-Lead ECG (09/08/2017 3:13 PM)   Component Value Ref Range   VENTRICULAR RATE EKG/MIN 61 BPM   ATRIAL RATE 61 BPM   AR-INTERVAL (MSEC) 172 ms   QRS-INTERVAL (MSEC) 86 ms   QT-INTERVAL (MSEC) 400 ms    ms   P AXIS 39 degrees   R AXIS 82 degrees   T AXIS 64 degrees     12-Lead ECG (09/08/2017 3:13 PM)   Specimen Performing Laboratory     MUSE       12-Lead ECG (09/08/2017 3:13 PM)   Impressions   Normal sinus rhythm    Normal ECG    No previous ECGs available       12-Lead ECG (09/08/2017 3:13 PM)   Procedure Note   Interface, Cardiology Orders In - 09/09/2017 8:09 AM PDT    IMPRESSION:  Normal sinus rhythm  Normal ECG  No previous ECGs available         Comprehensive " metabolic panel (09/08/2017 1:30 PM)  Comprehensive metabolic panel (09/08/2017 1:30 PM)   Component Value Ref Range   Sodium 137 137 - 145 mmol/L   Potassium 3.6 3.5 - 5.1 mmol/L   Chloride 104 98 - 107 mmol/L   CO2 Content 24 22 - 30 mmol/L   Glucose 117  Comment:   For FASTING GLUCOSE samples, the ADA recommended decision limits are shown below:     70-99 mg/dL:       Normal     100-125 mg/dL:     Impaired Fasting Glucose     >125 mg/dL:        Diabetes*  *In the absence of symptoms of unequivocal hyperglycemia, result should be confirmed by repeat testing.     70 - 125 mg/dL   Urea Nitrogen,Blood 16 7 - 17 mg/dL   Creatinine, Serum 0.7 0.5 - 1.0 mg/dL   Osmo Calc 286 280 - 305 mOs/Kg H2O   Albumin 4.3 3.5 - 5.0 g/dL   Protein, Total 7.4 6.3 - 8.2 g/dL   Calcium 9.4 8.4 - 10.2 mg/dL   Alk Phos 75 38 - 126 Units/L    (H) 14 - 36 Units/L   Bilirubin, Total 0.8 0.2 - 1.3 mg/dL   Anion Gap 9 6 - 14 mmol/L   ALT 61 (H) 9 - 52 Units/L   GFR Calc,Non-African >60 >60 mL/mn/1.73m2   GFRA Calc,African >60Comment: This estimated GFR calculation is not suitable for medication dosing. >60 mL/mn/1.73m2       CBC with Differential (09/08/2017 1:30 PM)  CBC with Differential (09/08/2017 1:30 PM)   Component Value Ref Range   WBC 6.1 3.8 - 11.0 K/mcL   RBC 4.98 4.0 - 5.2 M/mcL   Hgb 11.5 (L) 12.0 - 16.0 g/dL   HCT 34.6 (L) 36 - 46 %   MCV 70 (L) 80 - 100 fL   MCH 23 (L) 26 - 34 pg   MCHC 33 31 - 36 g/dL   RDW 14.0 (H) 10.5 - 13.5 %    150 - 450 K/mcL   Prelim Abs Neut Ct 4.18 1.8 - 7.7 K/mcL   Diff Type Auto     Neutrophils 69.1 44 - 70 %   Lymphocytes 25.6 25 - 46 %   Monocytes 4.0 1 - 12 %   Eosinophils 1.2 0 - 8 %   Basophils 0.1 0 - 2 %   Abs. Neut Ct. 4.18 1.8 - 7.7 K/mcL   Abs. Lymph Ct. 1.55 1.0 - 5.0 K/mcL   Abs. Mono Ct. 0.24 0 - 0.8 K/mcL   Abs. Eos Ct. 0.07 0 - 0.5 K/mcL   Abs. Baso Ct. 0.01 0 - 0.2 K/mcL     Lipase (09/08/2017 1:30 PM)  Lipase (09/08/2017 1:30 PM)   Component Value Ref Range   Lipase 112  23 - 300 Units/L     Interface, Radiology Results In - 09/08/2017 5:44 PM PDT    [facility]  [facility]  [address] [place], [place] [id number]   Telephone: [phone]  Fax: [phone]    Patient Name: PHILLY TREVINO Medical   Record #: [id number]    Accession #: [id number] Patient Type: ER  Acct #: [id number] Exam Date: 9/08/2017 Patient Location:   YOB: 1960 Patient Age: 56 Sex: F Exam   Time: 16:43  Referring Doctor: MILEY HARMON MD     Contrast or Isotope, if given (Agent/Dose-ml/Tech):   Rad Dose: CTDIvol (mGy)/DLP (mGy-cm), or Fluoroscopy Time:    PROCEDURE: ULTRASOUND ABDOMEN LIMITED    COMPARISON: None.    INDICATIONS: Abdominal pain.     FINDINGS:   LIVER: Normal. Normal size and echotexture. No significant masses.   PORTAL VEIN: Normal. The portal is patent with normal antegrade flow.   BILIARY: There is cholelithiasis. No gallbladder wall thickening or   pericholecystic fluid.   PANCREAS: Normal. No visible mass, abnormal atrophy, or ductal dilatation.     RIGHT KIDNEY: Normal. No mass or obstruction.   OTHER: Negative.     CONCLUSION: Cholelithiasis.      Recent Labs   Lab Test  07/19/16   1029  04/25/14   0940  10/31/13   0816   HGB  11.6*   --   12.3   PLT  228   --   262   NA  137  143   --    POTASSIUM  3.9  5.1   --    CR  0.76  0.88   --         IMPRESSION:                                                    Reason for surgery/procedure: cholelithiasis   Diagnosis/reason for consult: cardiopulmonary and clearance for surgery, metabolic syndrome , iron deficiency anemia     The proposed surgical procedure is considered INTERMEDIATE risk.    REVISED CARDIAC RISK INDEX  The patient has the following serious cardiovascular risks for perioperative complications such as (MI, PE, VFib and 3  AV Block):  No serious cardiac risks  INTERPRETATION: 0 risks: Class I (very low risk - 0.4% complication rate)    The patient has the following additional risks for  perioperative complications:  No identified additional risks      ICD-10-CM    1. Preop general physical exam Z01.818    2. Symptomatic cholelithiasis K80.20    3. Calculus of gallbladder without cholecystitis without obstruction K80.20    4. Iron deficiency anemia, unspecified iron deficiency anemia type D50.9    5. Metabolic syndrome E88.81        RECOMMENDATIONS:                                                          --Patient is to hold all vitamins/ supplements the am of surgery  Reviewed no NSAID 1 week prior     APPROVAL GIVEN to proceed with proposed procedure, without further diagnostic evaluation       Signed Electronically by: Shelia Llanos PA-C    Copy of this evaluation report is provided to requesting physician.    [place] Preop Guidelines

## 2017-10-02 NOTE — MR AVS SNAPSHOT
After Visit Summary   10/2/2017    Genia Infante    MRN: [id number]           Patient Information     Date Of Birth          1960        Visit Information        Provider Department      10/2/2017 9:40 AM Shelia Llanos PA-C [facility]        Today's Diagnoses     Preop general physical exam    -  1    Symptomatic cholelithiasis        Calculus of gallbladder without cholecystitis without obstruction        Iron deficiency anemia, unspecified iron deficiency anemia type        Metabolic syndrome          Care Instructions      Before Your Surgery      Call your surgeon if there is any change in your health. This includes signs of a cold or flu (such as a sore throat, runny nose, cough, rash or fever).    Do not smoke, drink alcohol or take over the counter medicine (unless your surgeon or primary care doctor tells you to) for the 24 hours before and after surgery.    If you take prescribed drugs: Follow your doctor s orders about which medicines to take and which to stop until after surgery.    Eating and drinking prior to surgery: follow the instructions from your surgeon    Take a shower or bath the night before surgery. Use the soap your surgeon gave you to gently clean your skin. If you do not have soap from your surgeon, use your regular soap. Do not shave or scrub the surgery site.  Wear clean pajamas and have clean sheets on your bed.           Follow-ups after your visit        Your next 10 appointments already scheduled     Oct 09, 2017  9:00 AM CDT   [facility] Same Day Surgery with Elie Atwood MD   [facility] ([facility])    [facility] ([facility])   [phone]            Oct 09, 2017   Procedure with Elie Atwood MD   [facility] (--)    [address]  [facility] [address]   [phone]             "  Who to contact     If you have questions or need follow up information about today's clinic visit or your schedule please contact [facility] directly at [phone].  Normal or non-critical lab and imaging results will be communicated to you by MyChart, letter or phone within 4 business days after the clinic has received the results. If you do not hear from us within 7 days, please contact the clinic through MyChart or phone. If you have a critical or abnormal lab result, we will notify you by phone as soon as possible.  Submit refill requests through CallYourPrice or call your pharmacy and they will forward the refill request to us. Please allow 3 business days for your refill to be completed.          Additional Information About Your Visit        [employer] Information     CallYourPrice gives you secure access to your electronic health record. If you see a primary care provider, you can also send messages to your care team and make appointments. If you have questions, please call your primary care clinic.  If you do not have a primary care provider, please call [phone] and they will assist you.        Care EveryWhere ID     This is your Care EveryWhere ID. This could be used by other organizations to access your [place] medical records  [id number]        Your Vitals Were     Pulse Temperature Height Last Period Pulse Oximetry BMI (Body Mass Index)    69 97.7  F (36.5  C) (Oral) 5' 7\" (1.702 m) 07/19/2015 100% 27.91 kg/m2       Blood Pressure from Last 3 Encounters:   10/02/17 118/82   09/21/17 90/58   07/27/17 120/80    Weight from Last 3 Encounters:   10/02/17 178 lb 3.2 oz (80.8 kg)   09/21/17 172 lb (78 kg)   07/27/17 172 lb 4.8 oz (78.2 kg)              Today, you had the following     No orders found for display       Primary Care Provider Office Phone # Fax #    John Giles [phone]       [address] [facility] [address]        Equal Access to Services     " KANE KING : Hadii aad ku cheyenne Ramesh, waaxda luqadaha, qaybta kaalmada zebnanci, bennie eula haydemar salazardeaconkasie gaona . So [facility] [phone].    ATENCIÓN: Si habla español, tiene a montes disposición servicios gratuitos de asistencia lingüística. Llame al [phone].    We comply with applicable federal civil rights laws and [place] laws. We do not discriminate on the basis of race, color, national origin, age, disability, sex, sexual orientation, or gender identity.            Thank you!     Thank you for choosing [facility]  for your care. Our goal is always to provide you with excellent care. Hearing back from our patients is one way we can continue to improve our services. Please take a few minutes to complete the written survey that you may receive in the mail after your visit with us. Thank you!             Your Updated Medication List - Protect others around you: Learn how to safely use, store and throw away your medicines at www.disposemymeds.org.          This list is accurate as of: 10/2/17 10:33 AM.  Always use your most recent med list.                   Brand Name Dispense Instructions for use Diagnosis    B COMPLEX 50 Tabs     100 tablet    Take 1 tablet by mouth twice a week INDICATION: VITAMIN SUPPLEMENT    Thiamin deficiency       Calcium Carb-Cholecalciferol 1000-800 MG-UNIT Tabs    CALCIUM 1000 + D    100 tablet    Take 1 tablet by mouth daily TAKE WITH FOOD, FOR BONE HEALTH AND FOR VITAMIN D SUPPLEMENTATION    Metabolic syndrome       Cyanocobalamin 5000 MCG/ML Liqd    B-12 SUPER STRENGTH    2 Bottle    Place 1 mL under the tongue daily FOR VITAMIN B12 SUPPLEMENTATION, PLEASE PLACE UNDER THE TONGUE    Vitamin B12 deficiency (non anemic)       vitamin D3 64613 UNITS capsule    CHOLECALCIFEROL    40 capsule    Take 1 capsule (50,000 Units) by mouth every 30 days INDICATION: VITAMIN D DEFICIENCY (LOW VITAMIN D)    Vitamin D deficiency disease

## 2017-10-04 ENCOUNTER — MYC MEDICAL ADVICE (OUTPATIENT)
Dept: INTERNAL MEDICINE | Facility: CLINIC | Age: 57
End: 2017-10-04

## 2017-10-04 NOTE — H&P (VIEW-ONLY)
[address] [address]  [phone]  Dept: [phone]    PRE-OP EVALUATION:  Today's date: 10/2/2017    Genia Infante (: 1960) presents for pre-operative evaluation assessment as requested by Dr. Atwood .  She requires evaluation and anesthesia risk assessment prior to undergoing surgery/procedure for treatment of Gallbladder stones .  Proposed procedure:LAPAROSCOPIC CHOLECYSTECTOMY     Date of Surgery/ Procedure: 10/09  Time of Surgery/ Procedure: 7:30  Hospital/Surgical Facility: [facility]     Primary Physician: John Giles  Type of Anesthesia Anticipated: General    Patient has a Health Care Directive or Living Will:  NO    Preop Questions 10/2/2017   1.  Do you have a history of heart attack, stroke, stent, bypass or surgery on an artery in the head, neck, heart or legs? No   2.  Do you ever have any pain or discomfort in your chest? No   3.  Do you have a history of  Heart Failure? No   4.   Are you troubled by shortness of breath when:  walking on a level surface, or up a slight hill, or at night? No   5.  Do you currently have a cold, bronchitis or other respiratory infection? No   6.  Do you have a cough, shortness of breath, or wheezing? No   7.  Do you sometimes get pains in the calves of your legs when you walk? No   8. Do you or anyone in your family have previous history of blood clots? No   9.  Do you or does anyone in your family have a serious bleeding problem such as prolonged bleeding following surgeries or cuts? No   10. Have you ever had problems with anemia or been told to take iron pills? YES - self    11. Have you had any abnormal blood loss such as black, tarry or bloody stools, or abnormal vaginal bleeding? No   12. Have you ever had a blood transfusion? No   13. Have you or any of your relatives ever had problems with anesthesia? No   14. Do you have sleep apnea, excessive snoring or daytime drowsiness? No    15. Do you have any prosthetic heart valves? No   16. Do you have prosthetic joints? No   17. Is there any chance that you may be pregnant? No           HPI:                                                      Brief HPI related to upcoming procedure: cholelithiasis.  ER visit 9/8/2017 in [place] for RUQ abdominal pain.  She has been on a low fat diet since dx.       See problem list for active medical problems.  Problems all longstanding and stable, except as noted/documented.  See ROS for pertinent symptoms related to these conditions.                                                                                                  .    MEDICAL HISTORY:                                                    Patient Active Problem List    Diagnosis Date Noted     Symptomatic cholelithiasis 09/22/2017     Priority: Medium     Cystocele, midline 07/14/2014     Priority: Medium     Urinary incontinence 07/14/2014     Priority: Medium     Vitamin D deficiency 05/02/2013     Priority: Medium     WITH BORDERLINE B12 LEVELS       Thiamin deficiency 05/02/2013     Priority: Medium     Iron deficiency anemia 05/02/2013     Priority: Medium     Metabolic syndrome 05/02/2013     Priority: Medium     Vitamin B12 deficiency without anemia 11/09/2012     Priority: Medium     Diagnosis updated by automated process. Provider to review and confirm.       Vitamin D deficiency disease 11/09/2012     Priority: Medium     Seasonal allergic rhinitis 11/09/2012     Priority: Medium     CARDIOVASCULAR SCREENING; LDL GOAL LESS THAN 160 10/31/2010     Priority: Medium     Beta thalassemia trait 09/12/2008     Priority: Medium     Diagnosis updated by automated process. Provider to review and confirm.       Trigeminal neuralgia      Priority: Medium      Past Medical History:   Diagnosis Date     Anemia     beta thalsiumia     Trigeminal neuralgia      Past Surgical History:   Procedure Laterality Date     APPENDECTOMY       COLONOSCOPY        OSTEOTOMY FOOT Bilateral 4/7/2015    Procedure: OSTEOTOMY FOOT;  Surgeon: Elie Markham DPM;  Location:  [facility]     wisdom teeth       Current Outpatient Prescriptions   Medication Sig Dispense Refill     Cyanocobalamin (B-12 SUPER STRENGTH) 5000 MCG/ML LIQD Place 1 mL under the tongue daily FOR VITAMIN B12 SUPPLEMENTATION, PLEASE PLACE UNDER THE TONGUE 2 Bottle PRN     Calcium Carb-Cholecalciferol (CALCIUM 1000 + D) 1000-800 MG-UNIT TABS Take 1 tablet by mouth daily TAKE WITH FOOD, FOR BONE HEALTH AND FOR VITAMIN D SUPPLEMENTATION 100 tablet PRN     B Complex Vitamins (B COMPLEX 50) TABS Take 1 tablet by mouth twice a week INDICATION: VITAMIN SUPPLEMENT 100 tablet PRN     vitamin D3 (CHOLECALCIFEROL) 61377 UNITS capsule Take 1 capsule (50,000 Units) by mouth every 30 days INDICATION: VITAMIN D DEFICIENCY (LOW VITAMIN D) 40 capsule PRN     OTC products: None, except as noted above and no recent use of OTC ASA, NSAIDS or Steroids    Allergies   Allergen Reactions     Niaspan [Niacin]      PARALYSIS AND SEVERE PAIN      Latex Allergy: NO    Social History   Substance Use Topics     Smoking status: Never Smoker     Smokeless tobacco: Never Used     Alcohol use Yes      Comment: x4 per week     History   Drug Use No       REVIEW OF SYSTEMS:                                                    C: NEGATIVE for fever, chills, change in weight  INTEGUMENTARY/SKIN: NEGATIVE for worrisome rashes, moles or lesions  EYES: NEGATIVE for vision changes or irritation  E/M: NEGATIVE for ear, mouth and throat problems  R: NEGATIVE for significant cough or SOB  CV: NEGATIVE for chest pain, palpitations or peripheral edema  MUSCULOSKELETAL: NEGATIVE for significant arthralgias or myalgia  NEURO: NEGATIVE for weakness, dizziness or paresthesias  ENDOCRINE: NEGATIVE for temperature intolerance, skin/hair changes  HEME/ALLERGY/IMMUNE: NEGATIVE for bleeding problems  PSYCHIATRIC: NEGATIVE for changes in mood or affect  ROS otherwise  "negative    EXAM:                                                    /82 (BP Location: Left arm, Patient Position: Chair, Cuff Size: Adult Regular)  Pulse 69  Temp 97.7  F (36.5  C) (Oral)  Ht 5' 7\" (1.702 m)  Wt 178 lb 3.2 oz (80.8 kg)  LMP 07/19/2015  SpO2 100%  BMI 27.91 kg/m2  GENERAL APPEARANCE: healthy, alert and no distress  EYES: Eyes grossly normal to inspection, PERRL and conjunctivae and sclerae normal  HENT: ear canals and TM's normal and nose and mouth without ulcers or lesions  RESP: lungs clear to auscultation - no rales, rhonchi or wheezes  CV: regular rate and rhythm, normal S1 S2, no S3 or S4 and no murmur, click or rub   ABDOMEN: soft, nontender, no HSM or masses and bowel sounds normal  MS: extremities normal- no gross deformities noted  SKIN: no suspicious lesions or rashes  NEURO: Normal strength and tone, sensory exam grossly normal, mentation intact and speech normal  PSYCH: mentation appears normal and affect normal/bright    DIAGNOSTICS:                                                    EKG: appears normal, NSR, normal axis, normal intervals, no acute ST/T changes c/w ischemia, no LVH by voltage criteria, there are no prior tracings available  From [facility]  12-Lead ECG (09/08/2017 3:13 PM)  12-Lead ECG (09/08/2017 3:13 PM)   Component Value Ref Range   VENTRICULAR RATE EKG/MIN 61 BPM   ATRIAL RATE 61 BPM   WY-INTERVAL (MSEC) 172 ms   QRS-INTERVAL (MSEC) 86 ms   QT-INTERVAL (MSEC) 400 ms    ms   P AXIS 39 degrees   R AXIS 82 degrees   T AXIS 64 degrees     12-Lead ECG (09/08/2017 3:13 PM)   Specimen Performing Laboratory     MUSE       12-Lead ECG (09/08/2017 3:13 PM)   Impressions   Normal sinus rhythm    Normal ECG    No previous ECGs available       12-Lead ECG (09/08/2017 3:13 PM)   Procedure Note   Interface, Cardiology Orders In - 09/09/2017 8:09 AM PDT    IMPRESSION:  Normal sinus rhythm  Normal ECG  No previous ECGs available         Comprehensive " metabolic panel (09/08/2017 1:30 PM)  Comprehensive metabolic panel (09/08/2017 1:30 PM)   Component Value Ref Range   Sodium 137 137 - 145 mmol/L   Potassium 3.6 3.5 - 5.1 mmol/L   Chloride 104 98 - 107 mmol/L   CO2 Content 24 22 - 30 mmol/L   Glucose 117  Comment:   For FASTING GLUCOSE samples, the ADA recommended decision limits are shown below:     70-99 mg/dL:       Normal     100-125 mg/dL:     Impaired Fasting Glucose     >125 mg/dL:        Diabetes*  *In the absence of symptoms of unequivocal hyperglycemia, result should be confirmed by repeat testing.     70 - 125 mg/dL   Urea Nitrogen,Blood 16 7 - 17 mg/dL   Creatinine, Serum 0.7 0.5 - 1.0 mg/dL   Osmo Calc 286 280 - 305 mOs/Kg H2O   Albumin 4.3 3.5 - 5.0 g/dL   Protein, Total 7.4 6.3 - 8.2 g/dL   Calcium 9.4 8.4 - 10.2 mg/dL   Alk Phos 75 38 - 126 Units/L    (H) 14 - 36 Units/L   Bilirubin, Total 0.8 0.2 - 1.3 mg/dL   Anion Gap 9 6 - 14 mmol/L   ALT 61 (H) 9 - 52 Units/L   GFR Calc,Non-African >60 >60 mL/mn/1.73m2   GFRA Calc,African >60Comment: This estimated GFR calculation is not suitable for medication dosing. >60 mL/mn/1.73m2       CBC with Differential (09/08/2017 1:30 PM)  CBC with Differential (09/08/2017 1:30 PM)   Component Value Ref Range   WBC 6.1 3.8 - 11.0 K/mcL   RBC 4.98 4.0 - 5.2 M/mcL   Hgb 11.5 (L) 12.0 - 16.0 g/dL   HCT 34.6 (L) 36 - 46 %   MCV 70 (L) 80 - 100 fL   MCH 23 (L) 26 - 34 pg   MCHC 33 31 - 36 g/dL   RDW 14.0 (H) 10.5 - 13.5 %    150 - 450 K/mcL   Prelim Abs Neut Ct 4.18 1.8 - 7.7 K/mcL   Diff Type Auto     Neutrophils 69.1 44 - 70 %   Lymphocytes 25.6 25 - 46 %   Monocytes 4.0 1 - 12 %   Eosinophils 1.2 0 - 8 %   Basophils 0.1 0 - 2 %   Abs. Neut Ct. 4.18 1.8 - 7.7 K/mcL   Abs. Lymph Ct. 1.55 1.0 - 5.0 K/mcL   Abs. Mono Ct. 0.24 0 - 0.8 K/mcL   Abs. Eos Ct. 0.07 0 - 0.5 K/mcL   Abs. Baso Ct. 0.01 0 - 0.2 K/mcL     Lipase (09/08/2017 1:30 PM)  Lipase (09/08/2017 1:30 PM)   Component Value Ref Range   Lipase 112  23 - 300 Units/L     Interface, Radiology Results In - 09/08/2017 5:44 PM PDT    [facility]  [facility]  [address] [place], [place] [address]   Telephone: [phone]  Fax: [phone]    Patient Name: PHILLY TREVINO Medical   Record #: [id number]    Accession #: [id number] Patient Type: ER  Acct #: [id number] Exam Date: 9/08/2017 Patient Location:   YOB: 1960 Patient Age: 56 Sex: F Exam   Time: 16:43  Referring Doctor: MILEY HARMON MD     Contrast or Isotope, if given (Agent/Dose-ml/Tech):   Rad Dose: CTDIvol (mGy)/DLP (mGy-cm), or Fluoroscopy Time:    PROCEDURE: ULTRASOUND ABDOMEN LIMITED    COMPARISON: None.    INDICATIONS: Abdominal pain.     FINDINGS:   LIVER: Normal. Normal size and echotexture. No significant masses.   PORTAL VEIN: Normal. The portal is patent with normal antegrade flow.   BILIARY: There is cholelithiasis. No gallbladder wall thickening or   pericholecystic fluid.   PANCREAS: Normal. No visible mass, abnormal atrophy, or ductal dilatation.     RIGHT KIDNEY: Normal. No mass or obstruction.   OTHER: Negative.     CONCLUSION: Cholelithiasis.      Recent Labs   Lab Test  07/19/16   1029  04/25/14   0940  10/31/13   0816   HGB  11.6*   --   12.3   PLT  228   --   262   NA  137  143   --    POTASSIUM  3.9  5.1   --    CR  0.76  0.88   --         IMPRESSION:                                                    Reason for surgery/procedure: cholelithiasis   Diagnosis/reason for consult: cardiopulmonary and clearance for surgery, metabolic syndrome , iron deficiency anemia     The proposed surgical procedure is considered INTERMEDIATE risk.    REVISED CARDIAC RISK INDEX  The patient has the following serious cardiovascular risks for perioperative complications such as (MI, PE, VFib and 3  AV Block):  No serious cardiac risks  INTERPRETATION: 0 risks: Class I (very low risk - 0.4% complication rate)    The patient has the following additional risks for  perioperative complications:  No identified additional risks      ICD-10-CM    1. Preop general physical exam Z01.818    2. Symptomatic cholelithiasis K80.20    3. Calculus of gallbladder without cholecystitis without obstruction K80.20    4. Iron deficiency anemia, unspecified iron deficiency anemia type D50.9    5. Metabolic syndrome E88.81        RECOMMENDATIONS:                                                          --Patient is to hold all vitamins/ supplements the am of surgery  Reviewed no NSAID 1 week prior     APPROVAL GIVEN to proceed with proposed procedure, without further diagnostic evaluation       Signed Electronically by: Shelia Llanos PA-C    Copy of this evaluation report is provided to requesting physician.    [facility] Preop Guidelines

## 2017-10-06 ENCOUNTER — ALLIED HEALTH/NURSE VISIT (OUTPATIENT)
Dept: NURSING | Facility: CLINIC | Age: 57
End: 2017-10-06
Payer: COMMERCIAL

## 2017-10-06 DIAGNOSIS — Z01.818 PRE-OP EXAM: Primary | ICD-10-CM

## 2017-10-06 DIAGNOSIS — Z53.9 ERRONEOUS ENCOUNTER--DISREGARD: ICD-10-CM

## 2017-10-06 PROCEDURE — 93000 ELECTROCARDIOGRAM COMPLETE: CPT

## 2017-10-06 NOTE — MR AVS SNAPSHOT
After Visit Summary   10/6/2017    Genia Infante    MRN: [id number]           Patient Information     Date Of Birth          1960        Visit Information        Provider Department      10/6/2017 10:00 AM [facility] - NURSE [facility]        Today's Diagnoses     Pre-op exam    -  1    ERRONEOUS ENCOUNTER--DISREGARD           Follow-ups after your visit        Who to contact     If you have questions or need follow up information about today's clinic visit or your schedule please contact [facility] directly at [phone].  Normal or non-critical lab and imaging results will be communicated to you by [employer]hart, letter or phone within 4 business days after the clinic has received the results. If you do not hear from us within 7 days, please contact the clinic through [employer]Orbitt or phone. If you have a critical or abnormal lab result, we will notify you by phone as soon as possible.  Submit refill requests through [employer] or call your pharmacy and they will forward the refill request to us. Please allow 3 business days for your refill to be completed.          Additional Information About Your Visit        MyChart Information     [employer] gives you secure access to your electronic health record. If you see a primary care provider, you can also send messages to your care team and make appointments. If you have questions, please call your primary care clinic.  If you do not have a primary care provider, please call [phone] and they will assist you.        Care EveryWhere ID     This is your Care EveryWhere ID. This could be used by other organizations to access your [place] medical records  [id number]        Your Vitals Were     Last Period                   07/19/2015            Blood Pressure from Last 3 Encounters:   10/09/17 125/84   10/02/17 118/82   09/21/17 90/58    Weight from Last 3 Encounters:   10/09/17 175 lb 6 oz (79.5 kg)    10/02/17 178 lb 3.2 oz (80.8 kg)   09/21/17 172 lb (78 kg)              We Performed the Following     EKG 12-lead complete w/read - Clinics        Primary Care Provider Office Phone # Fax #    John Giles [phone]       [address] [facility] [address]        Equal Access to Services     ROSITA KING : Hadii aad ku hadasho Soomaali, waaxda luqadaha, qaybta kaalmada adeegyada, waxay idiin hayaan adeeg kharash la'aan ah. So [facility] [phone].    ATENCIÓN: Si habla espkellee, tiene a montes disposición servicios gratuitos de asistencia lingüística. Elhamame al [phone].    We comply with applicable federal civil rights laws and [place] laws. We do not discriminate on the basis of race, color, national origin, age, disability, sex, sexual orientation, or gender identity.            Thank you!     Thank you for choosing [facility]  for your care. Our goal is always to provide you with excellent care. Hearing back from our patients is one way we can continue to improve our services. Please take a few minutes to complete the written survey that you may receive in the mail after your visit with us. Thank you!             Your Updated Medication List - Protect others around you: Learn how to safely use, store and throw away your medicines at www.disposemymeds.org.          This list is accurate as of: 10/6/17 11:59 PM.  Always use your most recent med list.                   Brand Name Dispense Instructions for use Diagnosis    B COMPLEX 50 Tabs     100 tablet    Take 1 tablet by mouth twice a week INDICATION: VITAMIN SUPPLEMENT    Thiamin deficiency       Calcium Carb-Cholecalciferol 1000-800 MG-UNIT Tabs    CALCIUM 1000 + D    100 tablet    Take 1 tablet by mouth daily TAKE WITH FOOD, FOR BONE HEALTH AND FOR VITAMIN D SUPPLEMENTATION    Metabolic syndrome       Cyanocobalamin 5000 MCG/ML Liqd    B-12 SUPER STRENGTH    2 Bottle    Place 1 mL under the tongue daily FOR  VITAMIN B12 SUPPLEMENTATION, PLEASE PLACE UNDER THE TONGUE    Vitamin B12 deficiency (non anemic)       HYDROcodone-acetaminophen 5-325 MG per tablet    NORCO    30 tablet    Take 1-2 tablets by mouth every 4 hours as needed for other (Moderate to Severe Pain)    Symptomatic cholelithiasis       vitamin D3 91565 UNITS capsule    CHOLECALCIFEROL    40 capsule    Take 1 capsule (50,000 Units) by mouth every 30 days INDICATION: VITAMIN D DEFICIENCY (LOW VITAMIN D)    Vitamin D deficiency disease

## 2017-10-09 ENCOUNTER — SURGERY (OUTPATIENT)
Age: 57
End: 2017-10-09

## 2017-10-09 ENCOUNTER — ANESTHESIA EVENT (OUTPATIENT)
Dept: SURGERY | Facility: CLINIC | Age: 57
End: 2017-10-09
Payer: COMMERCIAL

## 2017-10-09 ENCOUNTER — HOSPITAL ENCOUNTER (OUTPATIENT)
Facility: CLINIC | Age: 57
Discharge: HOME | End: 2017-10-09
Attending: SURGERY | Admitting: SURGERY
Payer: COMMERCIAL

## 2017-10-09 ENCOUNTER — OFFICE VISIT (OUTPATIENT)
Dept: SURGERY | Facility: PHYSICIAN GROUP | Age: 57
End: 2017-10-09
Payer: COMMERCIAL

## 2017-10-09 ENCOUNTER — ANESTHESIA (OUTPATIENT)
Dept: SURGERY | Facility: CLINIC | Age: 57
End: 2017-10-09
Payer: COMMERCIAL

## 2017-10-09 VITALS
SYSTOLIC BLOOD PRESSURE: 125 MMHG | HEIGHT: 67 IN | HEART RATE: 51 BPM | TEMPERATURE: 96.4 F | OXYGEN SATURATION: 98 % | RESPIRATION RATE: 16 BRPM | DIASTOLIC BLOOD PRESSURE: 84 MMHG | BODY MASS INDEX: 27.53 KG/M2 | WEIGHT: 175.38 LBS

## 2017-10-09 DIAGNOSIS — K80.20 SYMPTOMATIC CHOLELITHIASIS: Primary | ICD-10-CM

## 2017-10-09 PROCEDURE — 71000027 ZZH RECOVERY PHASE 2 EACH 15 MINS: Performed by: SURGERY

## 2017-10-09 PROCEDURE — 25000128 H RX IP 250 OP 636: Performed by: ANESTHESIOLOGY

## 2017-10-09 PROCEDURE — 71000012 ZZH RECOVERY PHASE 1 LEVEL 1 FIRST HR: Performed by: SURGERY

## 2017-10-09 PROCEDURE — 25000128 H RX IP 250 OP 636: Performed by: SURGERY

## 2017-10-09 PROCEDURE — 27210794 ZZH OR GENERAL SUPPLY STERILE: Performed by: SURGERY

## 2017-10-09 PROCEDURE — 27210995 ZZH RX 272: Performed by: SURGERY

## 2017-10-09 PROCEDURE — 36000056 ZZH SURGERY LEVEL 3 1ST 30 MIN: Performed by: SURGERY

## 2017-10-09 PROCEDURE — 88304 TISSUE EXAM BY PATHOLOGIST: CPT | Performed by: SURGERY

## 2017-10-09 PROCEDURE — 36000058 ZZH SURGERY LEVEL 3 EA 15 ADDTL MIN: Performed by: SURGERY

## 2017-10-09 PROCEDURE — 25000128 H RX IP 250 OP 636: Performed by: NURSE ANESTHETIST, CERTIFIED REGISTERED

## 2017-10-09 PROCEDURE — 37000008 ZZH ANESTHESIA TECHNICAL FEE, 1ST 30 MIN: Performed by: SURGERY

## 2017-10-09 PROCEDURE — 71000013 ZZH RECOVERY PHASE 1 LEVEL 1 EA ADDTL HR: Performed by: SURGERY

## 2017-10-09 PROCEDURE — 88304 TISSUE EXAM BY PATHOLOGIST: CPT | Mod: 26 | Performed by: SURGERY

## 2017-10-09 PROCEDURE — 25000132 ZZH RX MED GY IP 250 OP 250 PS 637: Performed by: PHYSICIAN ASSISTANT

## 2017-10-09 PROCEDURE — 25000566 ZZH SEVOFLURANE, EA 15 MIN: Performed by: SURGERY

## 2017-10-09 PROCEDURE — 37000009 ZZH ANESTHESIA TECHNICAL FEE, EACH ADDTL 15 MIN: Performed by: SURGERY

## 2017-10-09 PROCEDURE — 47562 LAPAROSCOPIC CHOLECYSTECTOMY: CPT | Mod: AS | Performed by: PHYSICIAN ASSISTANT

## 2017-10-09 PROCEDURE — 40000170 ZZH STATISTIC PRE-PROCEDURE ASSESSMENT II: Performed by: SURGERY

## 2017-10-09 PROCEDURE — 25000125 ZZHC RX 250: Performed by: NURSE ANESTHETIST, CERTIFIED REGISTERED

## 2017-10-09 PROCEDURE — 25000125 ZZHC RX 250: Performed by: SURGERY

## 2017-10-09 PROCEDURE — 25800025 ZZH RX 258: Performed by: SURGERY

## 2017-10-09 PROCEDURE — 47562 LAPAROSCOPIC CHOLECYSTECTOMY: CPT | Performed by: SURGERY

## 2017-10-09 RX ORDER — DEXAMETHASONE SODIUM PHOSPHATE 4 MG/ML
INJECTION, SOLUTION INTRA-ARTICULAR; INTRALESIONAL; INTRAMUSCULAR; INTRAVENOUS; SOFT TISSUE PRN
Status: DISCONTINUED | OUTPATIENT
Start: 2017-10-09 | End: 2017-10-09

## 2017-10-09 RX ORDER — BUPIVACAINE HYDROCHLORIDE 2.5 MG/ML
INJECTION, SOLUTION EPIDURAL; INFILTRATION; INTRACAUDAL
Status: DISCONTINUED
Start: 2017-10-09 | End: 2017-10-09 | Stop reason: HOSPADM

## 2017-10-09 RX ORDER — ONDANSETRON 2 MG/ML
4 INJECTION INTRAMUSCULAR; INTRAVENOUS EVERY 30 MIN PRN
Status: DISCONTINUED | OUTPATIENT
Start: 2017-10-09 | End: 2017-10-09 | Stop reason: HOSPADM

## 2017-10-09 RX ORDER — FENTANYL CITRATE 50 UG/ML
25-50 INJECTION, SOLUTION INTRAMUSCULAR; INTRAVENOUS EVERY 5 MIN PRN
Status: DISCONTINUED | OUTPATIENT
Start: 2017-10-09 | End: 2017-10-09 | Stop reason: HOSPADM

## 2017-10-09 RX ORDER — MAGNESIUM HYDROXIDE 1200 MG/15ML
LIQUID ORAL PRN
Status: DISCONTINUED | OUTPATIENT
Start: 2017-10-09 | End: 2017-10-09 | Stop reason: HOSPADM

## 2017-10-09 RX ORDER — HYDROMORPHONE HYDROCHLORIDE 1 MG/ML
.3-.5 INJECTION, SOLUTION INTRAMUSCULAR; INTRAVENOUS; SUBCUTANEOUS EVERY 10 MIN PRN
Status: DISCONTINUED | OUTPATIENT
Start: 2017-10-09 | End: 2017-10-09 | Stop reason: HOSPADM

## 2017-10-09 RX ORDER — PHYSOSTIGMINE SALICYLATE 1 MG/ML
1.2 INJECTION INTRAVENOUS
Status: DISCONTINUED | OUTPATIENT
Start: 2017-10-09 | End: 2017-10-09 | Stop reason: HOSPADM

## 2017-10-09 RX ORDER — PROPOFOL 10 MG/ML
INJECTION, EMULSION INTRAVENOUS CONTINUOUS PRN
Status: DISCONTINUED | OUTPATIENT
Start: 2017-10-09 | End: 2017-10-09

## 2017-10-09 RX ORDER — LIDOCAINE HYDROCHLORIDE 20 MG/ML
INJECTION, SOLUTION INFILTRATION; PERINEURAL PRN
Status: DISCONTINUED | OUTPATIENT
Start: 2017-10-09 | End: 2017-10-09

## 2017-10-09 RX ORDER — EPINEPHRINE 1 MG/ML
INJECTION, SOLUTION INTRAMUSCULAR; SUBCUTANEOUS
Status: DISCONTINUED
Start: 2017-10-09 | End: 2017-10-09 | Stop reason: HOSPADM

## 2017-10-09 RX ORDER — NEOSTIGMINE METHYLSULFATE 1 MG/ML
VIAL (ML) INJECTION PRN
Status: DISCONTINUED | OUTPATIENT
Start: 2017-10-09 | End: 2017-10-09

## 2017-10-09 RX ORDER — FENTANYL CITRATE 50 UG/ML
25-50 INJECTION, SOLUTION INTRAMUSCULAR; INTRAVENOUS
Status: DISCONTINUED | OUTPATIENT
Start: 2017-10-09 | End: 2017-10-09 | Stop reason: HOSPADM

## 2017-10-09 RX ORDER — CEFAZOLIN SODIUM 2 G/100ML
2 INJECTION, SOLUTION INTRAVENOUS
Status: COMPLETED | OUTPATIENT
Start: 2017-10-09 | End: 2017-10-09

## 2017-10-09 RX ORDER — SODIUM CHLORIDE, SODIUM LACTATE, POTASSIUM CHLORIDE, CALCIUM CHLORIDE 600; 310; 30; 20 MG/100ML; MG/100ML; MG/100ML; MG/100ML
INJECTION, SOLUTION INTRAVENOUS CONTINUOUS PRN
Status: DISCONTINUED | OUTPATIENT
Start: 2017-10-09 | End: 2017-10-09

## 2017-10-09 RX ORDER — HYDROCODONE BITARTRATE AND ACETAMINOPHEN 5; 325 MG/1; MG/1
1-2 TABLET ORAL EVERY 4 HOURS PRN
Qty: 30 TABLET | Refills: 0 | Status: SHIPPED | OUTPATIENT
Start: 2017-10-09

## 2017-10-09 RX ORDER — FENTANYL CITRATE 50 UG/ML
INJECTION, SOLUTION INTRAMUSCULAR; INTRAVENOUS PRN
Status: DISCONTINUED | OUTPATIENT
Start: 2017-10-09 | End: 2017-10-09

## 2017-10-09 RX ORDER — MEPERIDINE HYDROCHLORIDE 25 MG/ML
12.5 INJECTION INTRAMUSCULAR; INTRAVENOUS; SUBCUTANEOUS
Status: DISCONTINUED | OUTPATIENT
Start: 2017-10-09 | End: 2017-10-09 | Stop reason: HOSPADM

## 2017-10-09 RX ORDER — PROPOFOL 10 MG/ML
INJECTION, EMULSION INTRAVENOUS PRN
Status: DISCONTINUED | OUTPATIENT
Start: 2017-10-09 | End: 2017-10-09

## 2017-10-09 RX ORDER — ONDANSETRON 2 MG/ML
INJECTION INTRAMUSCULAR; INTRAVENOUS PRN
Status: DISCONTINUED | OUTPATIENT
Start: 2017-10-09 | End: 2017-10-09

## 2017-10-09 RX ORDER — CEFAZOLIN SODIUM 1 G/3ML
1 INJECTION, POWDER, FOR SOLUTION INTRAMUSCULAR; INTRAVENOUS SEE ADMIN INSTRUCTIONS
Status: DISCONTINUED | OUTPATIENT
Start: 2017-10-09 | End: 2017-10-09 | Stop reason: HOSPADM

## 2017-10-09 RX ORDER — ONDANSETRON 4 MG/1
4 TABLET, ORALLY DISINTEGRATING ORAL EVERY 30 MIN PRN
Status: DISCONTINUED | OUTPATIENT
Start: 2017-10-09 | End: 2017-10-09 | Stop reason: HOSPADM

## 2017-10-09 RX ORDER — NALOXONE HYDROCHLORIDE 0.4 MG/ML
.1-.4 INJECTION, SOLUTION INTRAMUSCULAR; INTRAVENOUS; SUBCUTANEOUS
Status: DISCONTINUED | OUTPATIENT
Start: 2017-10-09 | End: 2017-10-09 | Stop reason: HOSPADM

## 2017-10-09 RX ORDER — KETOROLAC TROMETHAMINE 30 MG/ML
30 INJECTION, SOLUTION INTRAMUSCULAR; INTRAVENOUS ONCE
Status: COMPLETED | OUTPATIENT
Start: 2017-10-09 | End: 2017-10-09

## 2017-10-09 RX ORDER — HYDROCODONE BITARTRATE AND ACETAMINOPHEN 5; 325 MG/1; MG/1
1-2 TABLET ORAL
Status: COMPLETED | OUTPATIENT
Start: 2017-10-09 | End: 2017-10-09

## 2017-10-09 RX ORDER — BUPIVACAINE HYDROCHLORIDE AND EPINEPHRINE 2.5; 5 MG/ML; UG/ML
INJECTION, SOLUTION INFILTRATION; PERINEURAL PRN
Status: DISCONTINUED | OUTPATIENT
Start: 2017-10-09 | End: 2017-10-09 | Stop reason: HOSPADM

## 2017-10-09 RX ORDER — GLYCOPYRROLATE 0.2 MG/ML
INJECTION, SOLUTION INTRAMUSCULAR; INTRAVENOUS PRN
Status: DISCONTINUED | OUTPATIENT
Start: 2017-10-09 | End: 2017-10-09

## 2017-10-09 RX ORDER — SODIUM CHLORIDE, SODIUM LACTATE, POTASSIUM CHLORIDE, CALCIUM CHLORIDE 600; 310; 30; 20 MG/100ML; MG/100ML; MG/100ML; MG/100ML
INJECTION, SOLUTION INTRAVENOUS CONTINUOUS
Status: DISCONTINUED | OUTPATIENT
Start: 2017-10-09 | End: 2017-10-09 | Stop reason: HOSPADM

## 2017-10-09 RX ADMIN — HYDROCODONE BITARTRATE AND ACETAMINOPHEN 1 TABLET: 5; 325 TABLET ORAL at 11:39

## 2017-10-09 RX ADMIN — CEFAZOLIN SODIUM 2 G: 2 INJECTION, SOLUTION INTRAVENOUS at 10:02

## 2017-10-09 RX ADMIN — PROPOFOL 50 MG: 10 INJECTION, EMULSION INTRAVENOUS at 10:18

## 2017-10-09 RX ADMIN — MIDAZOLAM HYDROCHLORIDE 2 MG: 1 INJECTION, SOLUTION INTRAMUSCULAR; INTRAVENOUS at 10:01

## 2017-10-09 RX ADMIN — PROPOFOL 200 MG: 10 INJECTION, EMULSION INTRAVENOUS at 10:01

## 2017-10-09 RX ADMIN — ROCURONIUM BROMIDE 30 MG: 10 INJECTION INTRAVENOUS at 10:01

## 2017-10-09 RX ADMIN — FENTANYL CITRATE 50 MCG: 50 INJECTION, SOLUTION INTRAMUSCULAR; INTRAVENOUS at 11:55

## 2017-10-09 RX ADMIN — SODIUM CHLORIDE 1000 ML: 0.9 IRRIGANT IRRIGATION at 10:09

## 2017-10-09 RX ADMIN — SODIUM CHLORIDE, POTASSIUM CHLORIDE, SODIUM LACTATE AND CALCIUM CHLORIDE: 600; 310; 30; 20 INJECTION, SOLUTION INTRAVENOUS at 09:03

## 2017-10-09 RX ADMIN — LIDOCAINE HYDROCHLORIDE 60 MG: 20 INJECTION, SOLUTION INFILTRATION; PERINEURAL at 10:01

## 2017-10-09 RX ADMIN — FENTANYL CITRATE 50 MCG: 50 INJECTION, SOLUTION INTRAMUSCULAR; INTRAVENOUS at 10:01

## 2017-10-09 RX ADMIN — ONDANSETRON 4 MG: 2 INJECTION INTRAMUSCULAR; INTRAVENOUS at 10:41

## 2017-10-09 RX ADMIN — NEOSTIGMINE METHYLSULFATE 2 MG: 1 INJECTION INTRAMUSCULAR; INTRAVENOUS; SUBCUTANEOUS at 10:35

## 2017-10-09 RX ADMIN — SODIUM CHLORIDE 900 ML: 900 IRRIGANT IRRIGATION at 10:33

## 2017-10-09 RX ADMIN — DEXMEDETOMIDINE HYDROCHLORIDE 4 MCG: 100 INJECTION, SOLUTION INTRAVENOUS at 10:14

## 2017-10-09 RX ADMIN — FENTANYL CITRATE 50 MCG: 50 INJECTION, SOLUTION INTRAMUSCULAR; INTRAVENOUS at 10:12

## 2017-10-09 RX ADMIN — DEXMEDETOMIDINE HYDROCHLORIDE 4 MCG: 100 INJECTION, SOLUTION INTRAVENOUS at 10:15

## 2017-10-09 RX ADMIN — BUPIVACAINE HYDROCHLORIDE AND EPINEPHRINE BITARTRATE 30 ML: 2.5; .005 INJECTION, SOLUTION EPIDURAL; INFILTRATION; INTRACAUDAL; PERINEURAL at 10:35

## 2017-10-09 RX ADMIN — GLYCOPYRROLATE 0.4 MG: 0.2 INJECTION, SOLUTION INTRAMUSCULAR; INTRAVENOUS at 10:35

## 2017-10-09 RX ADMIN — PROPOFOL 200 MCG/KG/MIN: 10 INJECTION, EMULSION INTRAVENOUS at 10:01

## 2017-10-09 RX ADMIN — KETOROLAC TROMETHAMINE 30 MG: 30 INJECTION, SOLUTION INTRAMUSCULAR at 11:17

## 2017-10-09 RX ADMIN — FENTANYL CITRATE 50 MCG: 50 INJECTION, SOLUTION INTRAMUSCULAR; INTRAVENOUS at 11:09

## 2017-10-09 RX ADMIN — DEXAMETHASONE SODIUM PHOSPHATE 4 MG: 4 INJECTION, SOLUTION INTRA-ARTICULAR; INTRALESIONAL; INTRAMUSCULAR; INTRAVENOUS; SOFT TISSUE at 10:27

## 2017-10-09 RX ADMIN — FENTANYL CITRATE 50 MCG: 50 INJECTION, SOLUTION INTRAMUSCULAR; INTRAVENOUS at 10:19

## 2017-10-09 NOTE — ANESTHESIA PREPROCEDURE EVALUATION
Anesthesia Evaluation     . Pt has had prior anesthetic. Type: General           ROS/MED HX    ENT/Pulmonary:       Neurologic:     (+)other neuro trigeminal neuralgia    Cardiovascular:         METS/Exercise Tolerance:     Hematologic:     (+) Anemia, -      Musculoskeletal:         GI/Hepatic:     (+) cholecystitis/cholelithiasis,       Renal/Genitourinary:         Endo:         Psychiatric:         Infectious Disease:         Malignancy:         Other:                                    Anesthesia Plan      History & Physical Review  History and physical reviewed and following examination; no interval change.    ASA Status:  2 .        Plan for General with Intravenous induction. Maintenance will be TIVA.    PONV prophylaxis:  Ondansetron (or other 5HT-3) and Dexamethasone or Solumedrol  Propofol, Zofran, and decadron      Postoperative Care  Postoperative pain management:  IV analgesics and Oral pain medications.      Consents  Anesthetic plan, risks, benefits and alternatives discussed with:  Patient..                          .

## 2017-10-09 NOTE — ANESTHESIA POSTPROCEDURE EVALUATION
Patient: Genia Infante    Procedure(s):  LAPAROSCOPIC CHOLECYSTECTOMY - Wound Class: II-Clean Contaminated    Diagnosis:GALLSTONES  Diagnosis Additional Information: No value filed.    Anesthesia Type:  General    Note:  Anesthesia Post Evaluation    Patient location during evaluation: PACU  Patient participation: Able to fully participate in evaluation  Level of consciousness: awake  Pain management: adequate  Airway patency: patent  Cardiovascular status: acceptable  Respiratory status: acceptable  Hydration status: acceptable  PONV: controlled     Anesthetic complications: None          Last vitals:  Vitals:    10/09/17 0733   BP: 124/75   Resp: 16   Temp: 35.9  C (96.7  F)   SpO2: 95%         Electronically Signed By: Noah Jordan MD  October 9, 2017  11:09 AM

## 2017-10-09 NOTE — ANESTHESIA CARE TRANSFER NOTE
Patient: Genia Infante    Procedure(s):  LAPAROSCOPIC CHOLECYSTECTOMY - Wound Class: II-Clean Contaminated    Diagnosis: GALLSTONES  Diagnosis Additional Information: No value filed.    Anesthesia Type:   General     Note:  Airway :Face Mask  Patient transferred to:PACU  Comments: 1053:  To par responsive, dentition unchanged from pre-op.      Vitals: (Last set prior to Anesthesia Care Transfer)    CRNA VITALS  10/9/2017 1022 - 10/9/2017 1052      10/9/2017             SpO2: 100 %    Resp Rate (observed): 12    Resp Rate (set): 10                Electronically Signed By: BITA Saucedo CRNA  October 9, 2017  10:52 AM

## 2017-10-09 NOTE — IP AVS SNAPSHOT
MRN:[id number]                      After Visit Summary   10/9/2017    Genia Infante    MRN: [id number]           Thank you!     Thank you for choosing [facility] for your care. Our goal is always to provide you with excellent care. Hearing back from our patients is one way we can continue to improve our services. Please take a few minutes to complete the written survey that you may receive in the mail after you visit with us. Thank you!        Patient Information     Date Of Birth          1960        About your hospital stay     You were admitted on:  October 9, 2017 You last received care in the:  [facility] Same Day Surgery    You were discharged on:  October 9, 2017       Who to Call     For medical emergencies, please call 911.  For non-urgent questions about your medical care, please call your primary care provider or clinic, [phone]  For questions related to your surgery, please call your surgery clinic        Attending Provider     Provider Elie Luo MD Surgery       Primary Care Provider Office Phone # Fax #    John Giles [phone]      Further instructions from your care team       [facility]  Discharge Instructions: Post-Operative Laparoscopic Cholecystectomy    ACTIVITY    Increase your activity gradually.  Avoid strenuous physical activity or heavy lifting greater than 15-20 lbs. for 2-3 weeks.  You may climb stairs.    You may drive without restrictions when you are not using any prescription pain medication and comfortable in a car.    You may return to work/school when you are comfortable without any prescription pain medication.    WOUND CARE    You may remove your bandage and shower 48 hours after the surgery.  Pat your incisions dry and leave open to air.  Re-apply dressing (Band-aid or gauze/tape) as needed for drainage.    You may have steri-strips (looks like tape)  or Dermabond (looks like glue) on your incision.  Leave it alone, it will peel up and fall off on its own.     Do not soak your incisions in a tub or pool for 2 weeks.     DIET    Return to the diet you were on before surgery.    Drink plenty of liquids to stay hydrated.     It is not uncommon to experience some loose stools or diarrhea after surgery.  This is your body s way of adapting to the bile which will slowly drain into your intestine. A low fat diet may help with this.     PAIN    Expect some tenderness and discomfort at the incision sites.  Use the prescribed pain medication at your discretion.  Expect gradual resolution of your pain over several days.    You may take ibuprofen with food (unless you have been told not to) instead of or in addition to your prescribed pain medication.  If you are taking Norco or Percocet, do not take any additional acetaminophen/APAP/Tylenol.    Do not drink alcohol or drive while you are taking pain medications.    You may apply ice to your incisions in 20 minute intervals as needed for the next 48 hours.  After that time, consider switching to heat if you prefer.    RETURN APPOINTMENT    Follow up with your surgeon or a PA in 2 weeks.  Please call the office at [phone] to schedule your appointment.    CALL OUR OFFICE IF YOU HAVE:     Chills or fever above 101.5 F.    Increased redness or drainage at your incisions.    Significant bleeding.    Pain not relieved by your pain medication or rest.    Increasing pain after the first 48 hours.    Any other concerns or questions.    HELPFUL HINTS    Pain medications can cause constipation.  Limit use when possible.  Take over the counter stool softener/stimulant, such as Colace or Senna, with plenty of water.  You may take a mild over the counter laxative, such as Miralax or a suppository, as needed.      For laparoscopic surgery, you may have shoulder or upper back discomfort due to the gas used in surgery.  This is  temporary and should resolve in 48-72 hours.  Short, frequent walks may help with this.    Revised August 2017    Same Day Surgery Discharge Instructions for  Sedation and General Anesthesia       It's not unusual to feel dizzy, light-headed or faint for up to 24 hours after surgery or while taking pain medication.  If you have these symptoms: sit for a few minutes before standing and have someone assist you when you get up to walk or use the bathroom.      You should rest and relax for the next 24 hours. We recommend you make arrangements to have an adult stay with you for at least 24 hours after your discharge.  Avoid hazardous and strenuous activity.      DO NOT DRIVE any vehicle or operate mechanical equipment for 24 hours following the end of your surgery.  Even though you may feel normal, your reactions may be affected by the medication you have received.      Do not drink alcoholic beverages for 24 hours following surgery.       Slowly progress to your regular diet as you feel able. It's not unusual to feel nauseated and/or vomit after receiving anesthesia.  If you develop these symptoms, drink clear liquids (apple juice, ginger ale, broth, 7-up, etc. ) until you feel better.  If your nausea and vomiting persists for 24 hours, please notify your surgeon.        All narcotic pain medications, along with inactivity and anesthesia, can cause constipation. Drinking plenty of liquids and increasing fiber intake will help.      For any questions of a medical nature, call your surgeon.      Do not make important decisions for 24 hours.      If you had general anesthesia, you may have a sore throat for a couple of days related to the breathing tube used during surgery.  You may use Cepacol lozenges to help with this discomfort.  If it worsens or if you develop a fever, contact your surgeon.       If you feel your pain is not well managed with the pain medications prescribed by your surgeon, please contact your  "surgeon's office to let them know so they can address your concerns.       While you were at the hospital today you received Toradol, an antiinflammatory medication similar to Ibuprofen.  You should not take other antiinflammatory medication, such as Ibuprofen, Motrin, Advil, Aleve, Naprosyn, etc, until 5PM.     Pending Results     No orders found from 10/7/2017 to 10/10/2017.            Admission Information     Date & Time Provider Department Dept. Phone    10/9/2017 Elie Atwood MD [facility] Same Day Surgery [phone]      Your Vitals Were     Blood Pressure Pulse Temperature Respirations Height Weight    128/79 51 96.2  F (35.7  C) (Temporal) 9 1.702 m (5' 7\") 79.5 kg (175 lb 6 oz)    Last Period Pulse Oximetry BMI (Body Mass Index)             07/19/2015 100% 27.47 kg/m2         MyChart Information     [employer] gives you secure access to your electronic health record. If you see a primary care provider, you can also send messages to your care team and make appointments. If you have questions, please call your primary care clinic.  If you do not have a primary care provider, please call [phone] and they will assist you.        Care EveryWhere ID     This is your Care EveryWhere ID. This could be used by other organizations to access your [facility] medical records  [id number]        Equal Access to Services     KANE KING : Daniela martinezo Sogreta, waaxda luqadaha, qaybta kaalmabennie jha. So [facility] [phone].    ATENCIÓN: Si habla español, tiene a montes disposición servicios gratuitos de asistencia lingüística. Llame al [phone].    We comply with applicable federal civil rights laws and [place] laws. We do not discriminate on the basis of race, color, national origin, age, disability, sex, sexual orientation, or gender identity.               Review of your medicines      START taking        Dose / Directions    " HYDROcodone-acetaminophen 5-325 MG per tablet   Commonly known as:  NORCO   Used for:  Symptomatic cholelithiasis   Notes to Patient:  One tablet taken at 11:37AM        Dose:  1-2 tablet   Take 1-2 tablets by mouth every 4 hours as needed for other (Moderate to Severe Pain)   Quantity:  30 tablet   Refills:  0         CONTINUE these medicines which have NOT CHANGED        Dose / Directions    B COMPLEX 50 Tabs   Used for:  Thiamin deficiency        Dose:  1 tablet   Take 1 tablet by mouth twice a week INDICATION: VITAMIN SUPPLEMENT   Quantity:  100 tablet   Refills:  PRN       Calcium Carb-Cholecalciferol 1000-800 MG-UNIT Tabs   Commonly known as:  CALCIUM 1000 + D   Used for:  Metabolic syndrome        Dose:  1 tablet   Take 1 tablet by mouth daily TAKE WITH FOOD, FOR BONE HEALTH AND FOR VITAMIN D SUPPLEMENTATION   Quantity:  100 tablet   Refills:  PRN       Cyanocobalamin 5000 MCG/ML Liqd   Commonly known as:  B-12 SUPER STRENGTH   Used for:  Vitamin B12 deficiency (non anemic)        Dose:  1 mL   Place 1 mL under the tongue daily FOR VITAMIN B12 SUPPLEMENTATION, PLEASE PLACE UNDER THE TONGUE   Quantity:  2 Bottle   Refills:  PRN       vitamin D3 92326 UNITS capsule   Commonly known as:  CHOLECALCIFEROL   Used for:  Vitamin D deficiency disease        Dose:  77073 Units   Take 1 capsule (50,000 Units) by mouth every 30 days INDICATION: VITAMIN D DEFICIENCY (LOW VITAMIN D)   Quantity:  40 capsule   Refills:  PRN            Where to get your medicines      Some of these will need a paper prescription and others can be bought over the counter. Ask your nurse if you have questions.     Bring a paper prescription for each of these medications     HYDROcodone-acetaminophen 5-325 MG per tablet                Protect others around you: Learn how to safely use, store and throw away your medicines at www.disposemymeds.org.             Medication List: This is a list of all your medications and when to take them. Check  marks below indicate your daily home schedule. Keep this list as a reference.      Medications           Morning Afternoon Evening Bedtime As Needed    B COMPLEX 50 Tabs   Take 1 tablet by mouth twice a week INDICATION: VITAMIN SUPPLEMENT                                Calcium Carb-Cholecalciferol 1000-800 MG-UNIT Tabs   Commonly known as:  CALCIUM 1000 + D   Take 1 tablet by mouth daily TAKE WITH FOOD, FOR BONE HEALTH AND FOR VITAMIN D SUPPLEMENTATION                                Cyanocobalamin 5000 MCG/ML Liqd   Commonly known as:  B-12 SUPER STRENGTH   Place 1 mL under the tongue daily FOR VITAMIN B12 SUPPLEMENTATION, PLEASE PLACE UNDER THE TONGUE                                HYDROcodone-acetaminophen 5-325 MG per tablet   Commonly known as:  NORCO   Take 1-2 tablets by mouth every 4 hours as needed for other (Moderate to Severe Pain)   Notes to Patient:  One tablet taken at 11:37AM                                vitamin D3 45358 UNITS capsule   Commonly known as:  CHOLECALCIFEROL   Take 1 capsule (50,000 Units) by mouth every 30 days INDICATION: VITAMIN D DEFICIENCY (LOW VITAMIN D)

## 2017-10-09 NOTE — BRIEF OP NOTE
General Surgery Brief Operative Note    Pre-operative diagnosis: GALLSTONES   Post-operative diagnosis Same   Procedure: Procedure(s):  LAPAROSCOPIC CHOLECYSTECTOMY - Wound Class: II-Clean Contaminated    Surgeon(s), Assistant(s): Surgeon(s) and Role:     * Elie Atwood MD - Primary     * Patience Hernandez PA-C - Assisting   Estimated blood loss: 5 mL   Drains: None   Specimens:   ID Type Source Tests Collected by Time Destination   A : Gallbladder and contents Tissue Gallbladder and Contents SURGICAL PATHOLOGY EXAM Elie Atwood MD 10/9/2017 10:25 AM       Findings: See postop diagnosis   Condition: Stable   Comments:      Patience Hernandez PA-C See dictated operative report for full details

## 2017-10-09 NOTE — IP AVS SNAPSHOT
[facility] Same Day Surgery    [address]    [place] [address]    Phone:  [phone]    Fax:  [phone]                                       After Visit Summary   10/9/2017    Genia Infante    MRN: [id number]           After Visit Summary Signature Page     I have received my discharge instructions, and my questions have been answered. I have discussed any challenges I see with this plan with the nurse or doctor.    ..........................................................................................................................................  Patient/Patient Representative Signature      ..........................................................................................................................................  Patient Representative Print Name and Relationship to Patient    ..................................................               ................................................  Date                                            Time    ..........................................................................................................................................  Reviewed by Signature/Title    ...................................................              ..............................................  Date                                                            Time

## 2017-10-09 NOTE — DISCHARGE INSTRUCTIONS
[facility] - [facility]  Discharge Instructions: Post-Operative Laparoscopic Cholecystectomy    ACTIVITY    Increase your activity gradually.  Avoid strenuous physical activity or heavy lifting greater than 15-20 lbs. for 2-3 weeks.  You may climb stairs.    You may drive without restrictions when you are not using any prescription pain medication and comfortable in a car.    You may return to work/school when you are comfortable without any prescription pain medication.    WOUND CARE    You may remove your bandage and shower 48 hours after the surgery.  Pat your incisions dry and leave open to air.  Re-apply dressing (Band-aid or gauze/tape) as needed for drainage.    You may have steri-strips (looks like tape) or Dermabond (looks like glue) on your incision.  Leave it alone, it will peel up and fall off on its own.     Do not soak your incisions in a tub or pool for 2 weeks.     DIET    Return to the diet you were on before surgery.    Drink plenty of liquids to stay hydrated.     It is not uncommon to experience some loose stools or diarrhea after surgery.  This is your body s way of adapting to the bile which will slowly drain into your intestine. A low fat diet may help with this.     PAIN    Expect some tenderness and discomfort at the incision sites.  Use the prescribed pain medication at your discretion.  Expect gradual resolution of your pain over several days.    You may take ibuprofen with food (unless you have been told not to) instead of or in addition to your prescribed pain medication.  If you are taking Norco or Percocet, do not take any additional acetaminophen/APAP/Tylenol.    Do not drink alcohol or drive while you are taking pain medications.    You may apply ice to your incisions in 20 minute intervals as needed for the next 48 hours.  After that time, consider switching to heat if you prefer.    RETURN APPOINTMENT    Follow up with your surgeon or a PA in 2 weeks.   Please call the office at [phone] to schedule your appointment.    CALL OUR OFFICE IF YOU HAVE:     Chills or fever above 101.5 F.    Increased redness or drainage at your incisions.    Significant bleeding.    Pain not relieved by your pain medication or rest.    Increasing pain after the first 48 hours.    Any other concerns or questions.    HELPFUL HINTS    Pain medications can cause constipation.  Limit use when possible.  Take over the counter stool softener/stimulant, such as Colace or Senna, with plenty of water.  You may take a mild over the counter laxative, such as Miralax or a suppository, as needed.      For laparoscopic surgery, you may have shoulder or upper back discomfort due to the gas used in surgery.  This is temporary and should resolve in 48-72 hours.  Short, frequent walks may help with this.    Revised August 2017    Same Day Surgery Discharge Instructions for  Sedation and General Anesthesia       It's not unusual to feel dizzy, light-headed or faint for up to 24 hours after surgery or while taking pain medication.  If you have these symptoms: sit for a few minutes before standing and have someone assist you when you get up to walk or use the bathroom.      You should rest and relax for the next 24 hours. We recommend you make arrangements to have an adult stay with you for at least 24 hours after your discharge.  Avoid hazardous and strenuous activity.      DO NOT DRIVE any vehicle or operate mechanical equipment for 24 hours following the end of your surgery.  Even though you may feel normal, your reactions may be affected by the medication you have received.      Do not drink alcoholic beverages for 24 hours following surgery.       Slowly progress to your regular diet as you feel able. It's not unusual to feel nauseated and/or vomit after receiving anesthesia.  If you develop these symptoms, drink clear liquids (apple juice, ginger ale, broth, 7-up, etc. ) until you feel better.  If  your nausea and vomiting persists for 24 hours, please notify your surgeon.        All narcotic pain medications, along with inactivity and anesthesia, can cause constipation. Drinking plenty of liquids and increasing fiber intake will help.      For any questions of a medical nature, call your surgeon.      Do not make important decisions for 24 hours.      If you had general anesthesia, you may have a sore throat for a couple of days related to the breathing tube used during surgery.  You may use Cepacol lozenges to help with this discomfort.  If it worsens or if you develop a fever, contact your surgeon.       If you feel your pain is not well managed with the pain medications prescribed by your surgeon, please contact your surgeon's office to let them know so they can address your concerns.       While you were at the hospital today you received Toradol, an antiinflammatory medication similar to Ibuprofen.  You should not take other antiinflammatory medication, such as Ibuprofen, Motrin, Advil, Aleve, Naprosyn, etc, until 5PM.

## 2017-10-09 NOTE — OP NOTE
General Surgery Operative Note    PREOPERATIVE DIAGNOSIS:  GALLSTONES    POSTOPERATIVE DIAGNOSIS:  Biliary colic    PROCEDURE:   Procedure(s):  LAPAROSCOPIC CHOLECYSTECTOMY    ANESTHESIA:  General.    PREOPERATIVE MEDICATIONS:  Ancef IV.    SURGEON:  Elie Atwood MD    ASSISTANT:  Patience Hernandez PA-C  A first assistant was necessary owing to challenging laparoscopic visualization and exposure.  Retraction was also necessary.    INDICATIONS:  Biliary colic    PROCEDURE:  The patient was taken to the operating suite and uneventfully endotracheally intubated.  The abdomen was prepped and draped in a sterile fashion.  Surgeon initiated timeout was acknowledged.  We entered the abdomen in the left upper quadrant using Visiport technique.  Two other trocars were placed under laparoscopic visualization.  We elevated the liver and were able to identify a somewhat inflamed gallbladder.  The gallbladder was grasped and used to elevate the liver further.  We began dissecting out some fatty adhesions down near the neck of the gallbladder until a cystic duct was encountered.  We continued our dissection using combination of sharp and blunt dissection until the cystic duct was largely dissected out.  We continued our dissection up along the sides of the gallbladder, both medially and laterally, until we had created a space between the gallbladder and the liver.  At this point, we encountered the cystic artery, just posterior and lateral to the cystic duct.  This again was dissected out.  Once we had created a window where only the cystic artery and duct were noted to be entering the gallbladder, we felt that this represented our critical view.  The cystic artery and duct were then doubly clipped and divided.  We continued our dissection up along the body of the gallbladder, freeing all attachments and adhesions of the gallbladder to the liver.  Gallbladder was removed from the liver in an atraumatic fashion.  The gallbladder was  then brought up through the umbilical port site and removed from the abdomen.  The gallbladder fossa was reinspected, and all areas of bleeding were managed with electrocautery.  We irrigated the area with normal saline and aspirated it out.  We then removed the umbilical port trocar and closed the fascia with a figure-of-eight 0 Vicryl suture.  This was done using the Kurt-Froy device.  We then reinspected the abdomen, and everything appeared to be in pristine condition.  We removed the trocars under laparoscopic visualization and desufflated the abdomen with the Brandon suction .  The skin edges were reapproximated with 4-0 Vicryl and Steri-Strips.  The patient was uneventfully extubated, awakened and taken to the PACU in stable condition.  At the conclusion of the case, all lap and needle counts were correct.      ESTIMATED BLOOD LOSS:  5 mL    INTRAOPERATIVE FINDINGS:  Mildly distended gallbladder    Elie Atwood MD, MD

## 2017-10-10 LAB — COPATH REPORT: NORMAL

## 2017-10-23 ENCOUNTER — OFFICE VISIT (OUTPATIENT)
Dept: SURGERY | Facility: CLINIC | Age: 57
End: 2017-10-23
Payer: COMMERCIAL

## 2017-10-23 DIAGNOSIS — Z09 SURGERY FOLLOW-UP: Primary | ICD-10-CM

## 2017-10-23 PROCEDURE — 99024 POSTOP FOLLOW-UP VISIT: CPT | Performed by: PHYSICIAN ASSISTANT

## 2017-10-23 NOTE — PROGRESS NOTES
[facility] Note     CC: Post-op check     Subjective:  Genia Infante is here for her first postoperative visit. She underwent a cholecystectomy by Dr. Atwood  on 10/9/17. Today she  tells me she is doing quite well. She currently does not need any pain medications, she is eating a normal diet and her bowels are regular. She has no concerns today other than some feeling of swelling or fat above her umbilical incision.  She feels like this varies from day to day, but usually notes it above and to left of umbilical incision.    Objective:  Abd - Abdomen soft, non-tender. BS normal. No masses, organomegaly  Inc - Healing well, well approximated and without signs of infection.  At her umbilical site she has some tenderness to deep palpation, but only very small hematoma or seroma can be felt    Pathology:  FINAL DIAGNOSIS:   Gallbladder, resection   - Chronic cholecystitis and cholelithiasis     Assessment:  S/p [place]on.     Plan:  Patient was instructed to call if fever, increasing pain, redness or drainage at the incision sites occurs.  I believe her pain at this site is from musculoskeletal source, possibly from the fascial suture in this area.  Should improve with time, rest, and heating pad for comfort.  She can follow up with us in another 1-2 weeks to be sure she is improving.  Otherwise she will follow up with Dr. Giles for her regular medical needs.    Jamal Sparks PA-C  [phone]    Please route or send letter to:  Primary Care Provider (PCP)

## 2017-10-23 NOTE — LETTER
2017    Re: Genia Infante - 1960    Genia Infante is here for her first postoperative visit. She underwent a cholecystectomy by Dr. Atwood  on 10/9/17. Today she  tells me she is doing quite well. She currently does not need any pain medications, she is eating a normal diet and her bowels are regular. She has no concerns today other than some feeling of swelling or fat above her umbilical incision.  She feels like this varies from day to day, but usually notes it above and to left of umbilical incision.     Objective:  Abd - Abdomen soft, non-tender. BS normal. No masses, organomegaly  Inc - Healing well, well approximated and without signs of infection.  At her umbilical site she has some tenderness to deep palpation, but only very small hematoma or seroma can be felt     Pathology:  FINAL DIAGNOSIS:   Gallbladder, resection   - Chronic cholecystitis and cholelithiasis      Assessment:  S/p Crow Adrienne.      Plan:  Patient was instructed to call if fever, increasing pain, redness or drainage at the incision sites occurs.  I believe her pain at this site is from musculoskeletal source, possibly from the fascial suture in this area.  Should improve with time, rest, and heating pad for comfort.  She can follow up with us in another 1-2 weeks to be sure she is improving.  Otherwise she will follow up with Dr. Giles for her regular medical needs.     Jamal Sparks PA-C  [phone]

## 2017-10-23 NOTE — MR AVS SNAPSHOT
After Visit Summary   10/23/2017    Genia Infante    MRN: [id number]           Patient Information     Date Of Birth          1960        Visit Information        Provider Department      10/23/2017 1:15 PM Jamal Sparks PA-C [facility]      Today's Diagnoses     Surgery follow-up    -  1       Follow-ups after your visit        Who to contact     If you have questions or need follow up information about today's clinic visit or your schedule please contact [facility] directly at [phone].  Normal or non-critical lab and imaging results will be communicated to you by [employer]hart, letter or phone within 4 business days after the clinic has received the results. If you do not hear from us within 7 days, please contact the clinic through [facility] or phone. If you have a critical or abnormal lab result, we will notify you by phone as soon as possible.  Submit refill requests through [facility] or call your pharmacy and they will forward the refill request to us. Please allow 3 business days for your refill to be completed.          Additional Information About Your Visit        MyChart Information     [facility] gives you secure access to your electronic health record. If you see a primary care provider, you can also send messages to your care team and make appointments. If you have questions, please call your primary care clinic.  If you do not have a primary care provider, please call [phone] and they will assist you.        Care EveryWhere ID     This is your Care EveryWhere ID. This could be used by other organizations to access your [facility] medical records  [id number]        Your Vitals Were     Last Period                   07/19/2015            Blood Pressure from Last 3 Encounters:   10/09/17 125/84   10/02/17 118/82   09/21/17 90/58    Weight from Last 3 Encounters:   10/09/17 175 lb 6 oz (79.5 kg)    10/02/17 178 lb 3.2 oz (80.8 kg)   09/21/17 172 lb (78 kg)              Today, you had the following     No orders found for display       Primary Care Provider Office Phone # Fax #    John Giles [phone]       [address] [facility] [address]        Equal Access to Services     [facility]: Hadii aad ku hadasho Soomaali, waaxda luqadaha, qaybta kaalmada adeegyada, waxay idiin hayaan adeeg bam lamargaretn ah. So [facility] [phone].    ATENCIÓN: Si toyala fran, tiene a montes disposición servicios gratuitos de asistencia lingüística. Chirag al [phone].    We comply with applicable federal civil rights laws and [place] laws. We do not discriminate on the basis of race, color, national origin, age, disability, sex, sexual orientation, or gender identity.            Thank you!     Thank you for choosing [facility]  for your care. Our goal is always to provide you with excellent care. Hearing back from our patients is one way we can continue to improve our services. Please take a few minutes to complete the written survey that you may receive in the mail after your visit with us. Thank you!             Your Updated Medication List - Protect others around you: Learn how to safely use, store and throw away your medicines at www.disposemymeds.org.          This list is accurate as of: 10/23/17  3:34 PM.  Always use your most recent med list.                   Brand Name Dispense Instructions for use Diagnosis    B COMPLEX 50 Tabs     100 tablet    Take 1 tablet by mouth twice a week INDICATION: VITAMIN SUPPLEMENT    Thiamin deficiency       Calcium Carb-Cholecalciferol 1000-800 MG-UNIT Tabs    CALCIUM 1000 + D    100 tablet    Take 1 tablet by mouth daily TAKE WITH FOOD, FOR BONE HEALTH AND FOR VITAMIN D SUPPLEMENTATION    Metabolic syndrome       Cyanocobalamin 5000 MCG/ML Liqd    B-12 SUPER STRENGTH    2 Bottle    Place 1 mL under the tongue daily FOR VITAMIN B12  SUPPLEMENTATION, PLEASE PLACE UNDER THE TONGUE    Vitamin B12 deficiency (non anemic)       HYDROcodone-acetaminophen 5-325 MG per tablet    NORCO    30 tablet    Take 1-2 tablets by mouth every 4 hours as needed for other (Moderate to Severe Pain)    Symptomatic cholelithiasis       vitamin D3 41434 UNITS capsule    CHOLECALCIFEROL    40 capsule    Take 1 capsule (50,000 Units) by mouth every 30 days INDICATION: VITAMIN D DEFICIENCY (LOW VITAMIN D)    Vitamin D deficiency disease

## 2018-05-17 ENCOUNTER — TRANSFERRED RECORDS (OUTPATIENT)
Dept: INTERNAL MEDICINE | Facility: CLINIC | Age: 58
End: 2018-05-17

## 2018-05-17 NOTE — PROGRESS NOTES
[facility] [place]  No primary Physician on file to forward, records sent to scanning

## 2019-09-29 ENCOUNTER — HEALTH MAINTENANCE LETTER (OUTPATIENT)
Age: 59
End: 2019-09-29

## 2021-01-14 ENCOUNTER — HEALTH MAINTENANCE LETTER (OUTPATIENT)
Age: 61
End: 2021-01-14

## 2021-10-23 ENCOUNTER — HEALTH MAINTENANCE LETTER (OUTPATIENT)
Age: 61
End: 2021-10-23

## 2022-02-12 ENCOUNTER — HEALTH MAINTENANCE LETTER (OUTPATIENT)
Age: 62
End: 2022-02-12

## 2022-10-10 ENCOUNTER — HEALTH MAINTENANCE LETTER (OUTPATIENT)
Age: 62
End: 2022-10-10

## 2023-03-25 ENCOUNTER — HEALTH MAINTENANCE LETTER (OUTPATIENT)
Age: 63
End: 2023-03-25

## 2023-10-29 ENCOUNTER — HEALTH MAINTENANCE LETTER (OUTPATIENT)
Age: 63
End: 2023-10-29

## (undated) DEVICE — SU VICRYL 0 UR-6 27" J603H

## (undated) DEVICE — ESU HOLDER LAP INST DISP PURPLE LONG 330MM H-PRO-330

## (undated) DEVICE — DRSG STERI STRIP 1/2X4" R1547

## (undated) DEVICE — ENDO TROCAR OPTICAL 05MM VERSAPORT PLUS W/FIX CAN ONB5STF

## (undated) DEVICE — SU VICRYL 4-0 PS-2 18" UND J496H

## (undated) DEVICE — ENDO TROCAR FIRST ENTRY KII FIOS Z-THRD 05X100MM CTF03

## (undated) DEVICE — LINEN TOWEL PACK X5 5464

## (undated) DEVICE — PREP CHLORAPREP 26ML TINTED ORANGE  260815

## (undated) DEVICE — SUCTION IRR STRYKERFLOW II W/TIP 250-070-520

## (undated) DEVICE — GLOVE PROTEXIS W/NEU-THERA 7.5  2D73TE75

## (undated) DEVICE — PACK LAP CHOLE SLC15LCFSD

## (undated) DEVICE — ENDO TROCAR OPTICAL 11MM VERSAPORT PLUS W/FIX CAN ONB11STF

## (undated) DEVICE — CLIP APPLIER ENDO 05MM MED/LG 176630

## (undated) DEVICE — SOL NACL 0.9% IRRIG 1000ML BOTTLE 2F7124

## (undated) DEVICE — ENDO POUCH GOLD 10MM ECATCH 173050G

## (undated) DEVICE — ESU CORD MONOPOLAR 10'  E0510

## (undated) DEVICE — ENDO CANNULA 05MM VERSAONE UNIVERSAL UNVCA5STF

## (undated) DEVICE — GLOVE PROTEXIS BLUE W/NEU-THERA 7.5  2D73EB75

## (undated) DEVICE — SOL NACL 0.9% INJ 1000ML BAG 2B1324X

## (undated) DEVICE — DRSG BANDAID 3/4X3"

## (undated) DEVICE — SUCTION CANISTER MEDIVAC LINER 3000ML W/LID 65651-530

## (undated) RX ORDER — FENTANYL CITRATE 50 UG/ML
INJECTION, SOLUTION INTRAMUSCULAR; INTRAVENOUS
Status: DISPENSED
Start: 2017-10-09

## (undated) RX ORDER — GLYCOPYRROLATE 0.2 MG/ML
INJECTION, SOLUTION INTRAMUSCULAR; INTRAVENOUS
Status: DISPENSED
Start: 2017-10-09

## (undated) RX ORDER — ONDANSETRON 2 MG/ML
INJECTION INTRAMUSCULAR; INTRAVENOUS
Status: DISPENSED
Start: 2017-10-09

## (undated) RX ORDER — DEXAMETHASONE SODIUM PHOSPHATE 4 MG/ML
INJECTION, SOLUTION INTRA-ARTICULAR; INTRALESIONAL; INTRAMUSCULAR; INTRAVENOUS; SOFT TISSUE
Status: DISPENSED
Start: 2017-10-09

## (undated) RX ORDER — CEFAZOLIN SODIUM 2 G/100ML
INJECTION, SOLUTION INTRAVENOUS
Status: DISPENSED
Start: 2017-10-09

## (undated) RX ORDER — PROPOFOL 10 MG/ML
INJECTION, EMULSION INTRAVENOUS
Status: DISPENSED
Start: 2017-10-09

## (undated) RX ORDER — KETOROLAC TROMETHAMINE 30 MG/ML
INJECTION, SOLUTION INTRAMUSCULAR; INTRAVENOUS
Status: DISPENSED
Start: 2017-10-09

## (undated) RX ORDER — LIDOCAINE HYDROCHLORIDE 20 MG/ML
INJECTION, SOLUTION EPIDURAL; INFILTRATION; INTRACAUDAL; PERINEURAL
Status: DISPENSED
Start: 2017-10-09

## (undated) RX ORDER — HYDROCODONE BITARTRATE AND ACETAMINOPHEN 5; 325 MG/1; MG/1
TABLET ORAL
Status: DISPENSED
Start: 2017-10-09